# Patient Record
Sex: FEMALE | Race: WHITE | NOT HISPANIC OR LATINO | URBAN - METROPOLITAN AREA
[De-identification: names, ages, dates, MRNs, and addresses within clinical notes are randomized per-mention and may not be internally consistent; named-entity substitution may affect disease eponyms.]

---

## 2019-04-06 ENCOUNTER — APPOINTMENT (OUTPATIENT)
Dept: RADIOLOGY | Facility: MEDICAL CENTER | Age: 49
DRG: 551 | End: 2019-04-06
Attending: SURGERY
Payer: COMMERCIAL

## 2019-04-06 ENCOUNTER — APPOINTMENT (OUTPATIENT)
Dept: RADIOLOGY | Facility: MEDICAL CENTER | Age: 49
DRG: 551 | End: 2019-04-06
Attending: NURSE PRACTITIONER
Payer: COMMERCIAL

## 2019-04-06 ENCOUNTER — APPOINTMENT (OUTPATIENT)
Dept: RADIOLOGY | Facility: MEDICAL CENTER | Age: 49
DRG: 551 | End: 2019-04-06
Payer: COMMERCIAL

## 2019-04-06 ENCOUNTER — APPOINTMENT (OUTPATIENT)
Dept: RADIOLOGY | Facility: MEDICAL CENTER | Age: 49
DRG: 551 | End: 2019-04-06
Attending: EMERGENCY MEDICINE
Payer: COMMERCIAL

## 2019-04-06 ENCOUNTER — HOSPITAL ENCOUNTER (INPATIENT)
Facility: MEDICAL CENTER | Age: 49
LOS: 4 days | DRG: 551 | End: 2019-04-10
Attending: EMERGENCY MEDICINE | Admitting: SURGERY
Payer: COMMERCIAL

## 2019-04-06 DIAGNOSIS — S12.600A CLOSED DISPLACED FRACTURE OF SEVENTH CERVICAL VERTEBRA, UNSPECIFIED FRACTURE MORPHOLOGY, INITIAL ENCOUNTER (HCC): ICD-10-CM
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
## 2019-04-06 DIAGNOSIS — T14.90XA TRAUMA: ICD-10-CM

## 2019-04-06 DIAGNOSIS — S42.254A CLOSED NONDISPLACED FRACTURE OF GREATER TUBEROSITY OF RIGHT HUMERUS, INITIAL ENCOUNTER: ICD-10-CM

## 2019-04-06 DIAGNOSIS — S39.92XA BACK INJURY DUE TO SKIING ACCIDENT, INITIAL ENCOUNTER: ICD-10-CM

## 2019-04-06 DIAGNOSIS — S22.009A CLOSED FRACTURE OF MULTIPLE THORACIC VERTEBRAE, INITIAL ENCOUNTER (HCC): ICD-10-CM

## 2019-04-06 DIAGNOSIS — S52.124A CLOSED NONDISPLACED FRACTURE OF HEAD OF RIGHT RADIUS, INITIAL ENCOUNTER: ICD-10-CM

## 2019-04-06 DIAGNOSIS — E87.6 HYPOKALEMIA: ICD-10-CM

## 2019-04-06 PROBLEM — S52.121A CLOSED FRACTURE OF HEAD OF RIGHT RADIUS: Status: ACTIVE | Noted: 2019-04-06

## 2019-04-06 PROBLEM — M25.511 ACUTE PAIN OF RIGHT SHOULDER: Status: ACTIVE | Noted: 2019-04-06

## 2019-04-06 PROBLEM — S42.251A CLOSED FRACTURE OF GREATER TUBEROSITY OF RIGHT HUMERUS: Status: ACTIVE | Noted: 2019-04-06

## 2019-04-06 PROBLEM — S06.0X1A CONCUSSION WITH LOSS OF CONSCIOUSNESS OF 30 MINUTES OR LESS: Status: ACTIVE | Noted: 2019-04-06

## 2019-04-06 PROBLEM — Z53.09 CONTRAINDICATION TO DEEP VEIN THROMBOSIS (DVT) PROPHYLAXIS: Status: ACTIVE | Noted: 2019-04-06

## 2019-04-06 PROBLEM — S22.21XA FRACTURE OF MANUBRIUM: Status: ACTIVE | Noted: 2019-04-06

## 2019-04-06 PROBLEM — S06.6X1A TRAUMATIC SUBARACHNOID HEMORRHAGE WITH LOSS OF CONSCIOUSNESS OF 30 MINUTES OR LESS (HCC): Status: ACTIVE | Noted: 2019-04-06

## 2019-04-06 PROBLEM — M48.50XA COMPRESSION FRACTURE OF VERTEBRA (HCC): Status: ACTIVE | Noted: 2019-04-06

## 2019-04-06 LAB
ABO GROUP BLD: NORMAL
ALBUMIN SERPL BCP-MCNC: 4.4 G/DL (ref 3.2–4.9)
ALBUMIN/GLOB SERPL: 1.6 G/DL
ALP SERPL-CCNC: 94 U/L (ref 30–99)
ALT SERPL-CCNC: 51 U/L (ref 2–50)
ANION GAP SERPL CALC-SCNC: 17 MMOL/L (ref 0–11.9)
APTT PPP: 27.6 SEC (ref 24.7–36)
AST SERPL-CCNC: 53 U/L (ref 12–45)
BILIRUB SERPL-MCNC: 0.6 MG/DL (ref 0.1–1.5)
BLD GP AB SCN SERPL QL: NORMAL
BUN SERPL-MCNC: 12 MG/DL (ref 8–22)
CALCIUM SERPL-MCNC: 9.3 MG/DL (ref 8.5–10.5)
CHLORIDE SERPL-SCNC: 105 MMOL/L (ref 96–112)
CO2 SERPL-SCNC: 22 MMOL/L (ref 20–33)
CREAT SERPL-MCNC: 0.73 MG/DL (ref 0.5–1.4)
EKG IMPRESSION: NORMAL
ERYTHROCYTE [DISTWIDTH] IN BLOOD BY AUTOMATED COUNT: 40.2 FL (ref 35.9–50)
ETHANOL BLD-MCNC: 0.01 G/DL
GLOBULIN SER CALC-MCNC: 2.7 G/DL (ref 1.9–3.5)
GLUCOSE SERPL-MCNC: 173 MG/DL (ref 65–99)
HCG SERPL QL: NEGATIVE
HCT VFR BLD AUTO: 38.4 % (ref 37–47)
HGB BLD-MCNC: 13.1 G/DL (ref 12–16)
INR PPP: 1.2 (ref 0.87–1.13)
MCH RBC QN AUTO: 30.6 PG (ref 27–33)
MCHC RBC AUTO-ENTMCNC: 34.1 G/DL (ref 33.6–35)
MCV RBC AUTO: 89.7 FL (ref 81.4–97.8)
PLATELET # BLD AUTO: 256 K/UL (ref 164–446)
PMV BLD AUTO: 9 FL (ref 9–12.9)
POTASSIUM SERPL-SCNC: 2.6 MMOL/L (ref 3.6–5.5)
PROT SERPL-MCNC: 7.1 G/DL (ref 6–8.2)
PROTHROMBIN TIME: 15.3 SEC (ref 12–14.6)
RBC # BLD AUTO: 4.28 M/UL (ref 4.2–5.4)
RH BLD: NORMAL
SODIUM SERPL-SCNC: 144 MMOL/L (ref 135–145)
WBC # BLD AUTO: 18.2 K/UL (ref 4.8–10.8)

## 2019-04-06 PROCEDURE — 72125 CT NECK SPINE W/O DYE: CPT

## 2019-04-06 PROCEDURE — 73030 X-RAY EXAM OF SHOULDER: CPT | Mod: RT

## 2019-04-06 PROCEDURE — 700102 HCHG RX REV CODE 250 W/ 637 OVERRIDE(OP): Performed by: NEUROLOGICAL SURGERY

## 2019-04-06 PROCEDURE — 99291 CRITICAL CARE FIRST HOUR: CPT

## 2019-04-06 PROCEDURE — 70450 CT HEAD/BRAIN W/O DYE: CPT

## 2019-04-06 PROCEDURE — 72131 CT LUMBAR SPINE W/O DYE: CPT

## 2019-04-06 PROCEDURE — 85610 PROTHROMBIN TIME: CPT

## 2019-04-06 PROCEDURE — 700111 HCHG RX REV CODE 636 W/ 250 OVERRIDE (IP): Performed by: EMERGENCY MEDICINE

## 2019-04-06 PROCEDURE — 700105 HCHG RX REV CODE 258: Performed by: SURGERY

## 2019-04-06 PROCEDURE — 306637 HCHG MISC ORTHO ITEM RC 0274

## 2019-04-06 PROCEDURE — 86900 BLOOD TYPING SEROLOGIC ABO: CPT

## 2019-04-06 PROCEDURE — 72128 CT CHEST SPINE W/O DYE: CPT

## 2019-04-06 PROCEDURE — 93005 ELECTROCARDIOGRAM TRACING: CPT | Performed by: NURSE PRACTITIONER

## 2019-04-06 PROCEDURE — 80307 DRUG TEST PRSMV CHEM ANLYZR: CPT

## 2019-04-06 PROCEDURE — 86901 BLOOD TYPING SEROLOGIC RH(D): CPT

## 2019-04-06 PROCEDURE — L0464 TLSO 4MOD SACRO-SCAP PRE: HCPCS

## 2019-04-06 PROCEDURE — 93010 ELECTROCARDIOGRAM REPORT: CPT | Performed by: INTERNAL MEDICINE

## 2019-04-06 PROCEDURE — 85027 COMPLETE CBC AUTOMATED: CPT

## 2019-04-06 PROCEDURE — 76705 ECHO EXAM OF ABDOMEN: CPT

## 2019-04-06 PROCEDURE — 73080 X-RAY EXAM OF ELBOW: CPT | Mod: RT

## 2019-04-06 PROCEDURE — 94760 N-INVAS EAR/PLS OXIMETRY 1: CPT

## 2019-04-06 PROCEDURE — 51798 US URINE CAPACITY MEASURE: CPT

## 2019-04-06 PROCEDURE — 700117 HCHG RX CONTRAST REV CODE 255: Performed by: EMERGENCY MEDICINE

## 2019-04-06 PROCEDURE — 71045 X-RAY EXAM CHEST 1 VIEW: CPT

## 2019-04-06 PROCEDURE — 84703 CHORIONIC GONADOTROPIN ASSAY: CPT

## 2019-04-06 PROCEDURE — 71260 CT THORAX DX C+: CPT

## 2019-04-06 PROCEDURE — 96374 THER/PROPH/DIAG INJ IV PUSH: CPT

## 2019-04-06 PROCEDURE — 700101 HCHG RX REV CODE 250: Performed by: NEUROLOGICAL SURGERY

## 2019-04-06 PROCEDURE — 72170 X-RAY EXAM OF PELVIS: CPT

## 2019-04-06 PROCEDURE — A9270 NON-COVERED ITEM OR SERVICE: HCPCS | Performed by: NEUROLOGICAL SURGERY

## 2019-04-06 PROCEDURE — G0390 TRAUMA RESPONS W/HOSP CRITI: HCPCS

## 2019-04-06 PROCEDURE — 86850 RBC ANTIBODY SCREEN: CPT

## 2019-04-06 PROCEDURE — 80053 COMPREHEN METABOLIC PANEL: CPT

## 2019-04-06 PROCEDURE — 85730 THROMBOPLASTIN TIME PARTIAL: CPT

## 2019-04-06 PROCEDURE — 96375 TX/PRO/DX INJ NEW DRUG ADDON: CPT

## 2019-04-06 PROCEDURE — 770022 HCHG ROOM/CARE - ICU (200)

## 2019-04-06 RX ORDER — DOCUSATE SODIUM 100 MG/1
100 CAPSULE, LIQUID FILLED ORAL 2 TIMES DAILY
Status: DISCONTINUED | OUTPATIENT
Start: 2019-04-06 | End: 2019-04-06

## 2019-04-06 RX ORDER — OXYCODONE HYDROCHLORIDE 5 MG/1
5 TABLET ORAL
Status: DISCONTINUED | OUTPATIENT
Start: 2019-04-06 | End: 2019-04-06

## 2019-04-06 RX ORDER — LORAZEPAM 1 MG/1
0.5 TABLET ORAL EVERY 6 HOURS PRN
Status: DISCONTINUED | OUTPATIENT
Start: 2019-04-06 | End: 2019-04-07

## 2019-04-06 RX ORDER — METOCLOPRAMIDE HYDROCHLORIDE 5 MG/ML
10 INJECTION INTRAMUSCULAR; INTRAVENOUS ONCE
Status: COMPLETED | OUTPATIENT
Start: 2019-04-06 | End: 2019-04-06

## 2019-04-06 RX ORDER — ACETAMINOPHEN 500 MG
1000 TABLET ORAL EVERY 6 HOURS
Status: DISCONTINUED | OUTPATIENT
Start: 2019-04-06 | End: 2019-04-10 | Stop reason: HOSPADM

## 2019-04-06 RX ORDER — OXYCODONE HYDROCHLORIDE 5 MG/1
2.5 TABLET ORAL
Status: DISCONTINUED | OUTPATIENT
Start: 2019-04-06 | End: 2019-04-10 | Stop reason: HOSPADM

## 2019-04-06 RX ORDER — SODIUM CHLORIDE, SODIUM LACTATE, POTASSIUM CHLORIDE, CALCIUM CHLORIDE 600; 310; 30; 20 MG/100ML; MG/100ML; MG/100ML; MG/100ML
INJECTION, SOLUTION INTRAVENOUS CONTINUOUS
Status: DISCONTINUED | OUTPATIENT
Start: 2019-04-06 | End: 2019-04-06

## 2019-04-06 RX ORDER — DOCUSATE SODIUM 100 MG/1
100 CAPSULE, LIQUID FILLED ORAL 2 TIMES DAILY
Status: DISCONTINUED | OUTPATIENT
Start: 2019-04-06 | End: 2019-04-10 | Stop reason: HOSPADM

## 2019-04-06 RX ORDER — ACETAMINOPHEN 650 MG/1
650 SUPPOSITORY RECTAL EVERY 4 HOURS PRN
Status: DISCONTINUED | OUTPATIENT
Start: 2019-04-06 | End: 2019-04-07

## 2019-04-06 RX ORDER — CALCIUM CARBONATE 500 MG/1
500 TABLET, CHEWABLE ORAL 2 TIMES DAILY
Status: DISCONTINUED | OUTPATIENT
Start: 2019-04-06 | End: 2019-04-10 | Stop reason: HOSPADM

## 2019-04-06 RX ORDER — ONDANSETRON 2 MG/ML
INJECTION INTRAMUSCULAR; INTRAVENOUS
Status: COMPLETED | OUTPATIENT
Start: 2019-04-06 | End: 2019-04-06

## 2019-04-06 RX ORDER — ENEMA 19; 7 G/133ML; G/133ML
1 ENEMA RECTAL
Status: DISCONTINUED | OUTPATIENT
Start: 2019-04-06 | End: 2019-04-06

## 2019-04-06 RX ORDER — MORPHINE SULFATE 4 MG/ML
4 INJECTION, SOLUTION INTRAMUSCULAR; INTRAVENOUS ONCE
Status: COMPLETED | OUTPATIENT
Start: 2019-04-06 | End: 2019-04-06

## 2019-04-06 RX ORDER — SODIUM CHLORIDE 9 MG/ML
INJECTION, SOLUTION INTRAVENOUS
Status: COMPLETED | OUTPATIENT
Start: 2019-04-06 | End: 2019-04-06

## 2019-04-06 RX ORDER — POLYETHYLENE GLYCOL 3350 17 G/17G
1 POWDER, FOR SOLUTION ORAL 2 TIMES DAILY PRN
Status: DISCONTINUED | OUTPATIENT
Start: 2019-04-06 | End: 2019-04-06

## 2019-04-06 RX ORDER — OXYCODONE HYDROCHLORIDE 5 MG/1
10 TABLET ORAL
Status: DISCONTINUED | OUTPATIENT
Start: 2019-04-06 | End: 2019-04-06

## 2019-04-06 RX ORDER — ACETAMINOPHEN 500 MG
1000 TABLET ORAL EVERY 6 HOURS
Status: DISCONTINUED | OUTPATIENT
Start: 2019-04-06 | End: 2019-04-06

## 2019-04-06 RX ORDER — DIPHENHYDRAMINE HYDROCHLORIDE 50 MG/ML
25 INJECTION INTRAMUSCULAR; INTRAVENOUS EVERY 6 HOURS PRN
Status: DISCONTINUED | OUTPATIENT
Start: 2019-04-06 | End: 2019-04-10 | Stop reason: HOSPADM

## 2019-04-06 RX ORDER — LABETALOL HYDROCHLORIDE 5 MG/ML
10 INJECTION, SOLUTION INTRAVENOUS
Status: DISCONTINUED | OUTPATIENT
Start: 2019-04-06 | End: 2019-04-07

## 2019-04-06 RX ORDER — GABAPENTIN 300 MG/1
300 CAPSULE ORAL 3 TIMES DAILY
Status: DISCONTINUED | OUTPATIENT
Start: 2019-04-06 | End: 2019-04-08

## 2019-04-06 RX ORDER — ENEMA 19; 7 G/133ML; G/133ML
1 ENEMA RECTAL
Status: DISCONTINUED | OUTPATIENT
Start: 2019-04-06 | End: 2019-04-10 | Stop reason: HOSPADM

## 2019-04-06 RX ORDER — MORPHINE SULFATE 4 MG/ML
2-4 INJECTION, SOLUTION INTRAMUSCULAR; INTRAVENOUS
Status: DISCONTINUED | OUTPATIENT
Start: 2019-04-06 | End: 2019-04-06

## 2019-04-06 RX ORDER — BISACODYL 10 MG
10 SUPPOSITORY, RECTAL RECTAL
Status: DISCONTINUED | OUTPATIENT
Start: 2019-04-06 | End: 2019-04-10 | Stop reason: HOSPADM

## 2019-04-06 RX ORDER — MORPHINE SULFATE 4 MG/ML
2 INJECTION, SOLUTION INTRAMUSCULAR; INTRAVENOUS
Status: DISCONTINUED | OUTPATIENT
Start: 2019-04-06 | End: 2019-04-07

## 2019-04-06 RX ORDER — LORAZEPAM 2 MG/ML
0.5 INJECTION INTRAMUSCULAR EVERY 6 HOURS PRN
Status: DISCONTINUED | OUTPATIENT
Start: 2019-04-06 | End: 2019-04-07

## 2019-04-06 RX ORDER — POTASSIUM CHLORIDE 7.45 MG/ML
10 INJECTION INTRAVENOUS ONCE
Status: COMPLETED | OUTPATIENT
Start: 2019-04-06 | End: 2019-04-06

## 2019-04-06 RX ORDER — SODIUM CHLORIDE AND POTASSIUM CHLORIDE 150; 900 MG/100ML; MG/100ML
INJECTION, SOLUTION INTRAVENOUS CONTINUOUS
Status: DISCONTINUED | OUTPATIENT
Start: 2019-04-06 | End: 2019-04-07

## 2019-04-06 RX ORDER — AMOXICILLIN 250 MG
1 CAPSULE ORAL NIGHTLY
Status: DISCONTINUED | OUTPATIENT
Start: 2019-04-06 | End: 2019-04-06

## 2019-04-06 RX ORDER — POLYETHYLENE GLYCOL 3350 17 G/17G
1 POWDER, FOR SOLUTION ORAL 2 TIMES DAILY
Status: DISCONTINUED | OUTPATIENT
Start: 2019-04-06 | End: 2019-04-10 | Stop reason: HOSPADM

## 2019-04-06 RX ORDER — AMOXICILLIN 250 MG
1 CAPSULE ORAL
Status: DISCONTINUED | OUTPATIENT
Start: 2019-04-06 | End: 2019-04-10 | Stop reason: HOSPADM

## 2019-04-06 RX ORDER — METHOCARBAMOL 750 MG/1
750 TABLET, FILM COATED ORAL EVERY 8 HOURS PRN
Status: DISCONTINUED | OUTPATIENT
Start: 2019-04-06 | End: 2019-04-10 | Stop reason: HOSPADM

## 2019-04-06 RX ORDER — OXYCODONE HYDROCHLORIDE 5 MG/1
5 TABLET ORAL
Status: DISCONTINUED | OUTPATIENT
Start: 2019-04-06 | End: 2019-04-10 | Stop reason: HOSPADM

## 2019-04-06 RX ORDER — ACETAMINOPHEN 325 MG/1
650 TABLET ORAL EVERY 4 HOURS PRN
Status: DISCONTINUED | OUTPATIENT
Start: 2019-04-06 | End: 2019-04-07

## 2019-04-06 RX ORDER — FAMOTIDINE 20 MG/1
20 TABLET, FILM COATED ORAL 2 TIMES DAILY
Status: DISCONTINUED | OUTPATIENT
Start: 2019-04-06 | End: 2019-04-07

## 2019-04-06 RX ORDER — BISACODYL 10 MG
10 SUPPOSITORY, RECTAL RECTAL
Status: DISCONTINUED | OUTPATIENT
Start: 2019-04-06 | End: 2019-04-06

## 2019-04-06 RX ORDER — ONDANSETRON 2 MG/ML
4 INJECTION INTRAMUSCULAR; INTRAVENOUS EVERY 4 HOURS PRN
Status: DISCONTINUED | OUTPATIENT
Start: 2019-04-06 | End: 2019-04-10 | Stop reason: HOSPADM

## 2019-04-06 RX ORDER — DIPHENHYDRAMINE HCL 25 MG
25 TABLET ORAL EVERY 6 HOURS PRN
Status: DISCONTINUED | OUTPATIENT
Start: 2019-04-06 | End: 2019-04-10 | Stop reason: HOSPADM

## 2019-04-06 RX ADMIN — VITAMIN D, TAB 1000IU (100/BT) 5000 UNITS: 25 TAB at 17:09

## 2019-04-06 RX ADMIN — ACETAMINOPHEN 1000 MG: 500 TABLET ORAL at 17:11

## 2019-04-06 RX ADMIN — ACETAMINOPHEN 1000 MG: 500 TABLET ORAL at 23:33

## 2019-04-06 RX ADMIN — OXYCODONE HYDROCHLORIDE 2.5 MG: 5 TABLET ORAL at 22:06

## 2019-04-06 RX ADMIN — POTASSIUM CHLORIDE 10 MEQ: 7.46 INJECTION, SOLUTION INTRAVENOUS at 17:12

## 2019-04-06 RX ADMIN — MORPHINE SULFATE 4 MG: 4 INJECTION INTRAVENOUS at 15:53

## 2019-04-06 RX ADMIN — POTASSIUM CHLORIDE AND SODIUM CHLORIDE: 900; 150 INJECTION, SOLUTION INTRAVENOUS at 17:12

## 2019-04-06 RX ADMIN — METOCLOPRAMIDE 10 MG: 5 INJECTION, SOLUTION INTRAMUSCULAR; INTRAVENOUS at 15:36

## 2019-04-06 RX ADMIN — OXYCODONE HYDROCHLORIDE 5 MG: 5 TABLET ORAL at 17:11

## 2019-04-06 RX ADMIN — SODIUM CHLORIDE 1000 ML: 9 INJECTION, SOLUTION INTRAVENOUS at 14:39

## 2019-04-06 RX ADMIN — GABAPENTIN 300 MG: 300 CAPSULE ORAL at 17:11

## 2019-04-06 RX ADMIN — ONDANSETRON 4 MG: 2 INJECTION INTRAMUSCULAR; INTRAVENOUS at 14:45

## 2019-04-06 RX ADMIN — IOHEXOL 100 ML: 350 INJECTION, SOLUTION INTRAVENOUS at 15:14

## 2019-04-06 RX ADMIN — FENTANYL CITRATE 50 MCG: 50 INJECTION, SOLUTION INTRAMUSCULAR; INTRAVENOUS at 14:43

## 2019-04-06 ASSESSMENT — COPD QUESTIONNAIRES
COPD SCREENING SCORE: 0
DO YOU EVER COUGH UP ANY MUCUS OR PHLEGM?: NO/ONLY WITH OCCASIONAL COLDS OR INFECTIONS
HAVE YOU SMOKED AT LEAST 100 CIGARETTES IN YOUR ENTIRE LIFE: NO/DON'T KNOW
DURING THE PAST 4 WEEKS HOW MUCH DID YOU FEEL SHORT OF BREATH: NONE/LITTLE OF THE TIME

## 2019-04-06 ASSESSMENT — COGNITIVE AND FUNCTIONAL STATUS - GENERAL
DRESSING REGULAR LOWER BODY CLOTHING: A LITTLE
MOBILITY SCORE: 18
SUGGESTED CMS G CODE MODIFIER MOBILITY: CK
MOVING FROM LYING ON BACK TO SITTING ON SIDE OF FLAT BED: A LITTLE
CLIMB 3 TO 5 STEPS WITH RAILING: A LITTLE
EATING MEALS: A LITTLE
DAILY ACTIVITIY SCORE: 18
MOVING TO AND FROM BED TO CHAIR: A LITTLE
HELP NEEDED FOR BATHING: A LITTLE
WALKING IN HOSPITAL ROOM: A LITTLE
STANDING UP FROM CHAIR USING ARMS: A LITTLE
TOILETING: A LITTLE
PERSONAL GROOMING: A LITTLE
TURNING FROM BACK TO SIDE WHILE IN FLAT BAD: A LITTLE
DRESSING REGULAR UPPER BODY CLOTHING: A LITTLE
SUGGESTED CMS G CODE MODIFIER DAILY ACTIVITY: CK

## 2019-04-06 ASSESSMENT — PATIENT HEALTH QUESTIONNAIRE - PHQ9
2. FEELING DOWN, DEPRESSED, IRRITABLE, OR HOPELESS: NOT AT ALL
SUM OF ALL RESPONSES TO PHQ9 QUESTIONS 1 AND 2: 0
1. LITTLE INTEREST OR PLEASURE IN DOING THINGS: NOT AT ALL

## 2019-04-06 ASSESSMENT — LIFESTYLE VARIABLES
ALCOHOL_USE: NO
EVER_SMOKED: NEVER
EVER_SMOKED: NEVER

## 2019-04-06 NOTE — ED NOTES
Unwitnessed event at [facility].  Helmeted skiier (Monica Franco  1971), dent to L. Helmet- inner shell cracked all the way through.  R. Shoulder pain and mid upper back pain, A/Ox3.  GCS 13

## 2019-04-06 NOTE — ASSESSMENT & PLAN NOTE
Mild acute compression fractures at T7, T8, T11 and T12 with minimal loss of height and at T7, T11 and T12.  Non-operative management.   TLSO when she sits up more than 30 degrees or when she is out of bed for 6 weeks  La Nena Tian MD. Neurosurgery.

## 2019-04-06 NOTE — RESPIRATORY CARE
Respiratory Trauma Red Note    Intubation no    SPO2 100% 15 lpm non re-breather.

## 2019-04-06 NOTE — ASSESSMENT & PLAN NOTE
Minimally displaced fracture of the anterior aspect of the manubrium, adjacent the sternomanubrial junction.  Aggressive pulmonary hygiene and multimodal pain management.

## 2019-04-06 NOTE — H&P
Trauma History and Physical  4/6/2019    Attending Physician: Shree Darnell MD.     CC: Trauma The patient was triaged as a Trauma Red in accordance with established pre hospital protols. An expeditious primary and secondary survey with required adjuncts was conducted. See Trauma Narrator for full details.    HPI: This is approximately 48-year-old female report skiing when struck tree.  Report loss of consciousness.  She reports pain upper thoracic spine right shoulder right elbow.    She states pain is sharp severe.  She states pain is made worse with movement.  She states pain is improved with rest and medication.  She states no numbness tingling or weakness anywhere in her body.    She states no chest pain.  She states no abdominal pain.  She states no pain in her lower extremities.  She states normal sensation and motor         No past medical history on file.    No past surgical history on file.    Current Facility-Administered Medications   Medication Dose Route Frequency Provider Last Rate Last Dose   • potassium chloride (KCL) ivpb 10 mEq  10 mEq Intravenous Once Sun Moore M.D.       • Respiratory Care per Protocol   Nebulization Continuous RT Vicky Salmeron A.P.N.       • Pharmacy Consult Request ...Pain Management Review 1 Each  1 Each Other PHARMACY TO DOSE Vicky Salmeron, A.P.N.       • docusate sodium (COLACE) capsule 100 mg  100 mg Oral BID Vicky Salmeron, A.P.N.       • senna-docusate (PERICOLACE or SENOKOT S) 8.6-50 MG per tablet 1 Tab  1 Tab Oral Q24HRS PRN Vicky Salmeron, A.P.N.       • polyethylene glycol/lytes (MIRALAX) PACKET 1 Packet  1 Packet Oral BID Vicky Salmeron, A.P.N.       • magnesium hydroxide (MILK OF MAGNESIA) suspension 30 mL  30 mL Oral DAILY Vicky Salmeron, A.P.N.       • bisacodyl (DULCOLAX) suppository 10 mg  10 mg Rectal Q24HRS PRN Vicky Salmeron, A.P.N.       • LR infusion   Intravenous Continuous Vicky Salmeron, A.P.N.       • famotidine  (PEPCID) tablet 20 mg  20 mg Oral BID Vicky Salmeron, A.P.N.        Or   • famotidine (PEPCID) injection 20 mg  20 mg Intravenous BID Vicky Salmeron, A.P.N.       • ondansetron (ZOFRAN) syringe/vial injection 4 mg  4 mg Intravenous Q4HRS PRN Vicky Salmeron, A.P.N.       • acetaminophen (TYLENOL) tablet 650 mg  650 mg Oral Q4HRS PRN Vicky Salmeron, A.P.N.        Or   • acetaminophen (TYLENOL) suppository 650 mg  650 mg Rectal Q4HRS PRN Vicky Salmeron A.P.N.       • acetaminophen (TYLENOL) tablet 1,000 mg  1,000 mg Oral Q6HRS La Nena Tian M.D.       • oxyCODONE immediate-release (ROXICODONE) tablet 2.5 mg  2.5 mg Oral Q3HRS PRN La Nena Tian M.D.       • oxyCODONE immediate-release (ROXICODONE) tablet 5 mg  5 mg Oral Q3HRS PRN La Nena Tian M.D.       • MD ALERT...DO NOT ADMINISTER NSAIDS or ASPIRIN unless ORDERED By Neurosurgery 1 Each  1 Each Other PRN La Nena Tian M.D.       • diphenhydrAMINE (BENADRYL) tablet/capsule 25 mg  25 mg Oral Q6HRS PRN La Nena Tian M.D.        Or   • diphenhydrAMINE (BENADRYL) injection 25 mg  25 mg Intravenous Q6HRS PRN La Nena Tian M.D.       • methocarbamol (ROBAXIN) tablet 750 mg  750 mg Oral Q8HRS PRN La Nena Tian M.D.       • LORazepam (ATIVAN) tablet 0.5 mg  0.5 mg Oral Q6HRS PRN La Nena Tian M.D.        Or   • LORazepam (ATIVAN) injection 0.5 mg  0.5 mg Intravenous Q6HRS PRN La Nena Tian M.D.       • labetalol (NORMODYNE,TRANDATE) injection 10 mg  10 mg Intravenous Q HOUR PRN La Nena Tian M.D.       • calcium carbonate (TUMS) chewable tab 500 mg  500 mg Oral BID La Nena Tian M.D.       • vitamin D (cholecalciferol) tablet 5,000 Units  5,000 Units Oral DAILY La Nena Tian M.D.       • senna-docusate (PERICOLACE or SENOKOT S) 8.6-50 MG per tablet 1 Tab  1 Tab Oral Q24HRS PRN La Nena Tian M.D.       • fleet enema 133 mL  1 Each Rectal Once PRN La Nena Tian M.D.       • 0.9 % NaCl with KCl 20 mEq infusion    "Intravenous Continuous La Nena Tian M.D.       • morphine (pf) 4 mg/ml injection 2 mg  2 mg Intravenous Q3HRS PRN La Nena Tian M.D.           Social History     Social History   • Marital status: N/A     Spouse name: N/A   • Number of children: N/A   • Years of education: N/A     Occupational History   • Not on file.     Social History Main Topics   • Smoking status: Not on file   • Smokeless tobacco: Not on file   • Alcohol use Not on file   • Drug use: Unknown   • Sexual activity: Not on file     Other Topics Concern   • Not on file     Social History Narrative   • No narrative on file       No family history on file.    Allergies:  Patient has no allergy information on record.    Review of Systems:  Positive noted above otherwise negative    Physical Exam:  /85   Pulse 83   Temp 35.6 °C (96.1 °F) (Temporal)   Resp 18   Ht 1.626 m (5' 4\")   Wt 59 kg (130 lb)   SpO2 98%     Constitutional: Awake, interactive  .Head:  Cephalohematoma/abrasion forehead. Pupils equal reactive  No bleeding ears nose or mouth     neck: No tracheal deviation. No midline cervical spine tenderness. C-collar in place. No stridor     cardiovascular: Normal rate, brisk capillary refill    Pulmonary/Chest: Clavicles nontender to palpation. There tenderness right shoulder no crepitus. Positive breath sounds bilaterally.     Abdominal: Soft, nondistended. Nontender to palpation. Pelvis is stable to anterior-posterior compression.     Musculoskeletal: Right upper extremity tenderness shoulder and elbow palpable pulse reports normal sensation   left upper extremity grossly atraumatic, palpable pulse.  No tenderness reports normal sensation  Lower extremities without tenderness.  Palpable pulses reports normal sensation     back: Midline thoracic tender to palpation. No step-offs. Mild sacral erythema present.    Neurological: Awake appropriate reports sensation intact  .   Skin: Skin is cool and dry.   Psychiatric:  Normal mood " and affect.  Behavior is appropriate.       Labs:  Recent Labs      04/06/19   1427   WBC  18.2*   RBC  4.28   HEMOGLOBIN  13.1   HEMATOCRIT  38.4   MCV  89.7   MCH  30.6   MCHC  34.1   RDW  40.2   PLATELETCT  256   MPV  9.0     Recent Labs      04/06/19   1427   SODIUM  144   POTASSIUM  2.6*   CHLORIDE  105   CO2  22   GLUCOSE  173*   BUN  12   CREATININE  0.73   CALCIUM  9.3     Recent Labs      04/06/19   1427   APTT  27.6   INR  1.20*     Recent Labs      04/06/19   1427   ASTSGOT  53*   ALTSGPT  51*   TBILIRUBIN  0.6   ALKPHOSPHAT  94   GLOBULIN  2.7   INR  1.20*       Radiology:  CT-CSPINE WITHOUT PLUS RECONS   Final Result      Fracture of the right C7 facet at the superior aspect. There is anterior displacement of that fracture component resulting in moderate narrowing of the right C6-7 neuroforamen. Alignment of cervical spine is otherwise well maintained.      This was discussed with MARNIE GUIDO at 3:22 PM on 4/6/2019.      CT-CHEST,ABDOMEN,PELVIS WITH   Final Result      1.  Bilateral dependent atelectasis.   2.  Probable minimally displaced fracture of the anterior aspect of the manubrium, adjacent the sternomanubrial junction.   3.  Solid organs of the abdomen are intact.   4.  Mild acute compression fractures at T7, T8, T11 and T12 with minimal loss of height and at T7, T11 and T12.      These findings were discussed with MARNIE GUIDO by Dr Tvaarez on 4/6/2019 3:21 PM.      CT-TSPINE W/O PLUS RECONS   Final Result      Compression fractures present at T7, T8, T9, T11 and T12. There is 20% height loss or less at these levels. No significant posterior bony retropulsion.      CT-LSPINE W/O PLUS RECONS   Final Result      1.  No evidence of fracture of the lumbar spine.      2.  Fracture of the T12 vertebral body which will be further discussed on the thoracic spine CT scan.      CT-HEAD W/O   Final Result      Minimal increased density in the LEFT posterior fossa anteriorly, likely choroid plexus  calcification, although small amount of subarachnoid hemorrhage is difficult to entirely exclude.  Follow-up recommended.         US-ABDOMEN F.A.S.T. LTD (FOR ED USE ONLY)   Final Result      No free fluid seen within the abdomen or pelvis.      DX-PELVIS-1 OR 2 VIEWS   Final Result      Limited exam showing no pelvic fracture.      DX-CHEST-LIMITED (1 VIEW)   Final Result      No evidence of acute intrathoracic injury.      DX-ELBOW-COMPLETE 3+ RIGHT    (Results Pending)   DX-SHOULDER 2+ RIGHT    (Results Pending)         Assessment: This is a approximately 48-year-old female critically injured report ski accident striking tree    Plan:   Active Hospital Problems    Diagnosis   • Compression fracture of vertebra (HCC) [M48.50XA]     Priority: High   • C7 cervical fracture (HCC) [S12.600A]     Priority: High   • Concussion with loss of consciousness of 30 minutes or less [S06.0X1A]     Priority: High   • Acute pain of right shoulder [M25.511]     Priority: Medium   • Contraindication to deep vein thrombosis (DVT) prophylaxis [Z53.09]     Priority: Medium   • Fracture of manubrium [S22.21XA]     Priority: Medium   • Hypokalemia [E87.6]     Priority: Medium   • Trauma [T14.90XA]     Priority: Low   Continue spine precautions.    Follow-up neurosurgery evaluation recommendations.    Pain control  Provide encouragement and support   Monitor neurostatus and comfort level  Adjust medications and comfort measures as needed    Card   monitor for signs of bleeding  Continue to monitor to maintain adequate HR and BP  Follow up labs    Pulm  continue aggressive pulmonary hygiene  Encourage cough deep breath, IS  scd dvt prohylaxis    GI   Bowel regime  Monitor abdominal exan    Renal  IV hydration  Monitor urine output, fluid balance        Critical care Time spent: 55 min    Shree Darnell MD, FACS  [facility]  [phone]

## 2019-04-06 NOTE — ASSESSMENT & PLAN NOTE
Fracture of the right C7 facet at the superior aspect. There is anterior displacement of that fracture component resulting in moderate narrowing of the right C6-7 neuroforamen.  4/9 MRI C-spine  Non-operative management. Payne J collar for 6 weeks.  La Nena Tian MD. Neurosurgery.

## 2019-04-06 NOTE — DISCHARGE PLANNING
Trauma Response    Referral: Trauma red Response    Intervention: SW responded to trauma red.  Pt was BIB [facility] after colliding with another skier.  Pt was confused upon arrival.  Pts name is Monica Franco (: 1970).  SW obtained the following pt information:MSW tried to talk to pt and she could not remember if anyone was with her. Pt stated she is from [place], [place]. MSW called and spoke to Natan at [facility]. Natan confirmed that p's daughter was with them on scene and is on her way to [facility] now.     Plan: MSW to remain available for support

## 2019-04-06 NOTE — ASSESSMENT & PLAN NOTE
Skier vs tree, helmeted, positive LOC.  Trauma Red Activation.  Shree Darnell MD. Trauma Surgery.

## 2019-04-06 NOTE — ASSESSMENT & PLAN NOTE
Possible traumatic subarachnoid hemorrhage left side posterior fossa.  Repeat head CT stable.  Non-operative management.   Post traumatic pharmacologic seizure prophylaxis not indicated.  4/8 SLP for cog eval negative for further cognitive evaluation  La Nena Tian MD. Neurosurgery.

## 2019-04-06 NOTE — CARE PLAN
Problem: Knowledge Deficit  Goal: Knowledge of disease process/condition, treatment plan, diagnostic tests, and medications will improve    Intervention: Explain information regarding disease process/condition, treatment plan, diagnostic tests, and medications and document in education  Welcomed to S-ICU. Updated patient on POC and anticipated next steps of hospitalization.       Problem: Pain Management  Goal: Pain level will decrease to patient's comfort goal    Intervention: Follow pain managment plan developed in collaboration with patient and Interdisciplinary Team  Assessed for pain and medicated per the MAR.

## 2019-04-06 NOTE — ED PROVIDER NOTES
"ED Provider Note    Scribed for Sun Moore M.D. by Garcia Dorman. 4/6/2019, 2:30 PM.    Means of arrival: EMS  History obtained from: EMS/ Patient  History limited by: Patient's altered mental status    CHIEF COMPLAINT  Trauma red - Skier vs tree    HPI  Degree Thirty-Six is a 89 y.o. female who presents to the Emergency Department as a trauma red following a skier vs tree accident. Patient was skiing at [facility] when she hit a tree. Patient had lost consciousness for 5 minutes and does not remember the events prior to her accident. EMS reports she is currently AOx3 and has a GCS 14, however is still confused. She was wearing a helmet which has dent on the outside of the shell and has a crack on the inside. Patient currently has nausea, shortness of breath and is complaining of right chest pain, right shoulder pain, and right side upper and mid back pain. She was given 4mg zofran and some fluids en route. She denies any past medical history or allergies to medications.    History is limited secondary to patient's altered mental status.    REVIEW OF SYSTEMS  Pertinent positives include nausea, loss of consciousness, shortness of breath, right chest pain, right shoulder pain, back pain.     ROS limited secondary to patient's altered mental status.    PAST MEDICAL HISTORY   None noted    SURGICAL HISTORY  patient denies any surgical history    SOCIAL HISTORY  None noted    FAMILY HISTORY  None noted    CURRENT MEDICATIONS  Current medications can be seen on the nurse’s note.    ALLERGIES  NKDA    PHYSICAL EXAM  VITAL SIGNS: /85   Pulse 83   Temp 35.6 °C (96.1 °F) (Temporal)   Resp 22   Ht 1.626 m (5' 4\")   Wt 59 kg (130 lb)   SpO2 100% Comment: NRB  BMI 22.31 kg/m²   Primary survey: airway clear, breathing with NRB, circulation well perfused, alert but slightly confused moving all extremities exposure was full  Constitutional: Alert in mild distress.  HENT: Normocephalic, Abrasion to left midline " forehead. Bilateral external ears normal without davies signs, no hemotympanum, Nose normal. Nose non-tender, no septal hematoma, no midface instability.  Eyes: Pupils are 3mm and equal and reactive, Conjunctiva normal, Non-icteric.EOMI.  Neck: In full spinal precautions  Cardiovascular: Regular rate and rhythm, no murmurs. Well perfused.  Thorax & Lungs: Equal breath sounds bilaterally, No respiratory distress, No wheezing, No chest tenderness.   Abdomen: Bowel sounds normal, Soft, No tenderness, No masses, No peritoneal signs.  Skin: Warm, Dry, No erythema, No rash.   Back: Mid thoracic spine tenderness, no step offs or deformities.   Musculoskeletal: No tenderness to palpation. No lower extremity deformities noted. Pelvis stable.      LABS  Labs Reviewed   DIAGNOSTIC ALCOHOL - Abnormal; Notable for the following:        Result Value    Diagnostic Alcohol 0.01 (*)     All other components within normal limits   CBC WITHOUT DIFFERENTIAL - Abnormal; Notable for the following:     WBC 18.2 (*)     All other components within normal limits   COMP METABOLIC PANEL - Abnormal; Notable for the following:     Potassium 2.6 (*)     Anion Gap 17.0 (*)     Glucose 173 (*)     AST(SGOT) 53 (*)     ALT(SGPT) 51 (*)     All other components within normal limits   PROTHROMBIN TIME - Abnormal; Notable for the following:     PT 15.3 (*)     INR 1.20 (*)     All other components within normal limits   APTT   HCG QUAL SERUM   COD (ADULT)   COMPONENT CELLULAR   ESTIMATED GFR   ABO AND RH CONFIRMATION   PLATELET MAPPING WITH BASIC TEG     All labs reviewed by me.      RADIOLOGY  DX-ELBOW-COMPLETE 3+ RIGHT   Final Result      Subtle findings raising concern for radial head fracture.  Follow-up recommended.      DX-SHOULDER 2+ RIGHT   Final Result      1.  Probable subtle RIGHT greater tuberosity fracture.   2.  No RIGHT shoulder dislocation.      CT-CSPINE WITHOUT PLUS RECONS   Final Result      Fracture of the right C7 facet at the  superior aspect. There is anterior displacement of that fracture component resulting in moderate narrowing of the right C6-7 neuroforamen. Alignment of cervical spine is otherwise well maintained.      This was discussed with MARNIE GUIDO at 3:22 PM on 4/6/2019.      CT-CHEST,ABDOMEN,PELVIS WITH   Final Result      1.  Bilateral dependent atelectasis.   2.  Probable minimally displaced fracture of the anterior aspect of the manubrium, adjacent the sternomanubrial junction.   3.  Solid organs of the abdomen are intact.   4.  Mild acute compression fractures at T7, T8, T11 and T12 with minimal loss of height and at T7, T11 and T12.      These findings were discussed with MARNIE GUIDO by Dr Tavarez on 4/6/2019 3:21 PM.      CT-TSPINE W/O PLUS RECONS   Final Result      Compression fractures present at T7, T8, T9, T11 and T12. There is 20% height loss or less at these levels. No significant posterior bony retropulsion.      CT-LSPINE W/O PLUS RECONS   Final Result      1.  No evidence of fracture of the lumbar spine.      2.  Fracture of the T12 vertebral body which will be further discussed on the thoracic spine CT scan.      CT-HEAD W/O   Final Result      Minimal increased density in the LEFT posterior fossa anteriorly, likely choroid plexus calcification, although small amount of subarachnoid hemorrhage is difficult to entirely exclude.  Follow-up recommended.         US-ABDOMEN F.A.S.T. LTD (FOR ED USE ONLY)   Final Result      No free fluid seen within the abdomen or pelvis.      DX-PELVIS-1 OR 2 VIEWS   Final Result      Limited exam showing no pelvic fracture.      DX-CHEST-LIMITED (1 VIEW)   Final Result      No evidence of acute intrathoracic injury.      DX-CHEST-PORTABLE (1 VIEW)    (Results Pending)   CT-HEAD W/O    (Results Pending)     The radiologist's interpretation of all radiological studies have been reviewed by me.    COURSE & MEDICAL DECISION MAKING  Pertinent Labs & Imaging studies reviewed. (See  chart for details)    2:49 PM - Patient seen and examined at bedside. Presents a trauma red following a skier vs tree accident. Chief complaint is nausea and mid thoracic pain. Dr. Darnell, trauma, in trauma bay evaluating. Patient will be treated with zofran 3mg, fentanyl 50. Ordered CT CTL spine, CT head, chest and pelvis xray, diagnostic alcohol, CBC, CMP, PTINR, APTT, HCG qual serum, Component cellular, estimated GFR, COD to evaluate her symptoms.     3:24 PM - Reevaluated patient, she is still confused and complaining of nausea. Spoke with Dr. Darnell trauma surgeon for admission. Patient's care was transferred at this time.    3:38 PM - Reviewed imaging results. Patient has T7, 8, 9, 11, 12 fractures with 20% height loss. C7 with some anterior displacement. Paged neurosurgery.    3:50 PM - Spoke with Dr. Tian, neurosurgery, who will consult on admission.       Decision Making:  This is a 48 y.o. year old female who presents with head injury after hitting a tree skiing.  She has hypokalemia on her labs.  She has a C7 fracture and multiple thoracic spine fractures.  She is quite concussed as well persistently.  Solid organs appear normal on CT scan.  She is a slight leukocytosis likely secondary to trauma.  Given her multiple fractures she will be admitted for pain control and further observation by trauma surgery.      DISPOSITION:  Patient will be admitted to Dr. Darnell in guarded condition.      FINAL IMPRESSION  1. Back injury due to skiing accident, initial encounter    2. Closed displaced fracture of seventh cervical vertebra, unspecified fracture morphology, initial encounter ([facility])    3. Closed fracture of multiple thoracic vertebrae, initial encounter ([facility])    4. Hypokalemia           This dictation has been created using voice recognition software and/or scribes. The accuracy of the dictation is limited by the abilities of the software and the expertise of the scribes. I expect there may be some errors  of grammar and possibly content. I made every attempt to manually correct the errors within my dictation. However, errors related to voice recognition software and/or scribes may still exist and should be interpreted within the appropriate context.     I, Garcia Dorman (Scribe), am scribing for, and in the presence of, Sun Moore M.D..    Electronically signed by: Garcia Dorman (Scribe), 4/6/2019    I, Sun Moore M.D. personally performed the services described in this documentation, as scribed by Garcia Dorman in my presence, and it is both accurate and complete. C.    The note accurately reflects work and decisions made by me.  Sun Moore  4/6/2019  7:58 PM

## 2019-04-06 NOTE — PROGRESS NOTES
Pt arrived to [facility] with ACLS RN and RN apprentice. Temp: 97.0f, wt: 62.5kg    HR SR 91  /82  SpO2 90% 2 L NC  RR 20/regular    2 RN skin check:   bilat ankle abrasions  bilat heels red/blanching, floated on pillows  Sacrum red/blanching, mepilex applied  Forehead lac, ROSE  Red under collar, new [place] J collar applied

## 2019-04-06 NOTE — ASSESSMENT & PLAN NOTE
Systemic anticoagulation contraindicated secondary to elevated bleeding risk.  4/7 Chemical DVT prophylaxis (Lovenox) initiated, 40mg daily per neurosurgery.  Ambulate TID.  Trauma duplex as clinically indicated.
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
## 2019-04-07 ENCOUNTER — APPOINTMENT (OUTPATIENT)
Dept: RADIOLOGY | Facility: MEDICAL CENTER | Age: 49
DRG: 551 | End: 2019-04-07
Attending: NURSE PRACTITIONER
Payer: COMMERCIAL

## 2019-04-07 ENCOUNTER — HOSPITAL ENCOUNTER (OUTPATIENT)
Dept: RADIOLOGY | Facility: MEDICAL CENTER | Age: 49
End: 2019-04-07
Attending: NEUROLOGICAL SURGERY
Payer: COMMERCIAL

## 2019-04-07 PROBLEM — Z02.9 DISCHARGE PLANNING ISSUES: Status: ACTIVE | Noted: 2019-04-07

## 2019-04-07 PROBLEM — E87.6 HYPOKALEMIA: Status: RESOLVED | Noted: 2019-04-06 | Resolved: 2019-04-07

## 2019-04-07 PROBLEM — R33.9 URINARY RETENTION: Status: ACTIVE | Noted: 2019-04-07

## 2019-04-07 LAB
ALBUMIN SERPL BCP-MCNC: 3.5 G/DL (ref 3.2–4.9)
ALBUMIN/GLOB SERPL: 1.6 G/DL
ALP SERPL-CCNC: 49 U/L (ref 30–99)
ALT SERPL-CCNC: 44 U/L (ref 2–50)
ANION GAP SERPL CALC-SCNC: 8 MMOL/L (ref 0–11.9)
AST SERPL-CCNC: 60 U/L (ref 12–45)
BASOPHILS # BLD AUTO: 0.1 % (ref 0–1.8)
BASOPHILS # BLD: 0.01 K/UL (ref 0–0.12)
BILIRUB SERPL-MCNC: 0.6 MG/DL (ref 0.1–1.5)
BUN SERPL-MCNC: 12 MG/DL (ref 8–22)
CALCIUM SERPL-MCNC: 8.5 MG/DL (ref 8.5–10.5)
CHLORIDE SERPL-SCNC: 111 MMOL/L (ref 96–112)
CO2 SERPL-SCNC: 22 MMOL/L (ref 20–33)
CREAT SERPL-MCNC: 0.56 MG/DL (ref 0.5–1.4)
EOSINOPHIL # BLD AUTO: 0 K/UL (ref 0–0.51)
EOSINOPHIL NFR BLD: 0 % (ref 0–6.9)
ERYTHROCYTE [DISTWIDTH] IN BLOOD BY AUTOMATED COUNT: 42.9 FL (ref 35.9–50)
GLOBULIN SER CALC-MCNC: 2.2 G/DL (ref 1.9–3.5)
GLUCOSE SERPL-MCNC: 125 MG/DL (ref 65–99)
HCT VFR BLD AUTO: 32.2 % (ref 37–47)
HGB BLD-MCNC: 10.7 G/DL (ref 12–16)
IMM GRANULOCYTES # BLD AUTO: 0.09 K/UL (ref 0–0.11)
IMM GRANULOCYTES NFR BLD AUTO: 0.7 % (ref 0–0.9)
LYMPHOCYTES # BLD AUTO: 1.26 K/UL (ref 1–4.8)
LYMPHOCYTES NFR BLD: 9.6 % (ref 22–41)
MCH RBC QN AUTO: 30.9 PG (ref 27–33)
MCHC RBC AUTO-ENTMCNC: 33.2 G/DL (ref 33.6–35)
MCV RBC AUTO: 93.1 FL (ref 81.4–97.8)
MONOCYTES # BLD AUTO: 0.66 K/UL (ref 0–0.85)
MONOCYTES NFR BLD AUTO: 5 % (ref 0–13.4)
NEUTROPHILS # BLD AUTO: 11.14 K/UL (ref 2–7.15)
NEUTROPHILS NFR BLD: 84.6 % (ref 44–72)
NRBC # BLD AUTO: 0 K/UL
NRBC BLD-RTO: 0 /100 WBC
PLATELET # BLD AUTO: 161 K/UL (ref 164–446)
PMV BLD AUTO: 9.3 FL (ref 9–12.9)
POTASSIUM SERPL-SCNC: 4.4 MMOL/L (ref 3.6–5.5)
PROT SERPL-MCNC: 5.7 G/DL (ref 6–8.2)
RBC # BLD AUTO: 3.46 M/UL (ref 4.2–5.4)
SODIUM SERPL-SCNC: 141 MMOL/L (ref 135–145)
WBC # BLD AUTO: 13.2 K/UL (ref 4.8–10.8)

## 2019-04-07 PROCEDURE — A9270 NON-COVERED ITEM OR SERVICE: HCPCS | Performed by: NURSE PRACTITIONER

## 2019-04-07 PROCEDURE — A9270 NON-COVERED ITEM OR SERVICE: HCPCS | Performed by: NEUROLOGICAL SURGERY

## 2019-04-07 PROCEDURE — 85025 COMPLETE CBC W/AUTO DIFF WBC: CPT

## 2019-04-07 PROCEDURE — 770001 HCHG ROOM/CARE - MED/SURG/GYN PRIV*

## 2019-04-07 PROCEDURE — 80053 COMPREHEN METABOLIC PANEL: CPT

## 2019-04-07 PROCEDURE — L0172 CERV COL SR FOAM 2PC PRE OTS: HCPCS

## 2019-04-07 PROCEDURE — 700101 HCHG RX REV CODE 250: Performed by: NEUROLOGICAL SURGERY

## 2019-04-07 PROCEDURE — 700112 HCHG RX REV CODE 229: Performed by: NURSE PRACTITIONER

## 2019-04-07 PROCEDURE — 99233 SBSQ HOSP IP/OBS HIGH 50: CPT | Performed by: SURGERY

## 2019-04-07 PROCEDURE — 71045 X-RAY EXAM CHEST 1 VIEW: CPT

## 2019-04-07 PROCEDURE — 306637 HCHG MISC ORTHO ITEM RC 0274

## 2019-04-07 PROCEDURE — 700111 HCHG RX REV CODE 636 W/ 250 OVERRIDE (IP): Performed by: NEUROLOGICAL SURGERY

## 2019-04-07 PROCEDURE — 70450 CT HEAD/BRAIN W/O DYE: CPT

## 2019-04-07 PROCEDURE — 700102 HCHG RX REV CODE 250 W/ 637 OVERRIDE(OP): Performed by: NURSE PRACTITIONER

## 2019-04-07 PROCEDURE — 700111 HCHG RX REV CODE 636 W/ 250 OVERRIDE (IP): Performed by: NURSE PRACTITIONER

## 2019-04-07 PROCEDURE — 700102 HCHG RX REV CODE 250 W/ 637 OVERRIDE(OP): Performed by: NEUROLOGICAL SURGERY

## 2019-04-07 RX ADMIN — OXYCODONE HYDROCHLORIDE 2.5 MG: 5 TABLET ORAL at 23:22

## 2019-04-07 RX ADMIN — ACETAMINOPHEN 1000 MG: 500 TABLET ORAL at 05:37

## 2019-04-07 RX ADMIN — GABAPENTIN 300 MG: 300 CAPSULE ORAL at 18:15

## 2019-04-07 RX ADMIN — FAMOTIDINE 20 MG: 20 TABLET ORAL at 05:37

## 2019-04-07 RX ADMIN — ACETAMINOPHEN 1000 MG: 500 TABLET ORAL at 18:15

## 2019-04-07 RX ADMIN — GABAPENTIN 300 MG: 300 CAPSULE ORAL at 05:37

## 2019-04-07 RX ADMIN — VITAMIN D, TAB 1000IU (100/BT) 5000 UNITS: 25 TAB at 05:36

## 2019-04-07 RX ADMIN — ACETAMINOPHEN 1000 MG: 500 TABLET ORAL at 23:18

## 2019-04-07 RX ADMIN — ACETAMINOPHEN 1000 MG: 500 TABLET ORAL at 12:02

## 2019-04-07 RX ADMIN — OXYCODONE HYDROCHLORIDE 2.5 MG: 5 TABLET ORAL at 13:25

## 2019-04-07 RX ADMIN — ANTACID TABLETS 500 MG: 500 TABLET, CHEWABLE ORAL at 05:38

## 2019-04-07 RX ADMIN — POLYETHYLENE GLYCOL 3350 1 PACKET: 17 POWDER, FOR SOLUTION ORAL at 18:16

## 2019-04-07 RX ADMIN — ANTACID TABLETS 500 MG: 500 TABLET, CHEWABLE ORAL at 18:16

## 2019-04-07 RX ADMIN — GABAPENTIN 300 MG: 300 CAPSULE ORAL at 12:02

## 2019-04-07 RX ADMIN — POTASSIUM CHLORIDE AND SODIUM CHLORIDE: 900; 150 INJECTION, SOLUTION INTRAVENOUS at 03:31

## 2019-04-07 RX ADMIN — DOCUSATE SODIUM 100 MG: 100 CAPSULE, LIQUID FILLED ORAL at 18:16

## 2019-04-07 RX ADMIN — ENOXAPARIN SODIUM 40 MG: 100 INJECTION SUBCUTANEOUS at 18:16

## 2019-04-07 RX ADMIN — ONDANSETRON 4 MG: 2 INJECTION INTRAMUSCULAR; INTRAVENOUS at 00:36

## 2019-04-07 RX ADMIN — DOCUSATE SODIUM 100 MG: 100 CAPSULE, LIQUID FILLED ORAL at 05:37

## 2019-04-07 ASSESSMENT — ENCOUNTER SYMPTOMS
BLURRED VISION: 0
HEADACHES: 0
SHORTNESS OF BREATH: 0
ABDOMINAL PAIN: 0
COUGH: 0
BACK PAIN: 1
SENSORY CHANGE: 1
VOMITING: 0
MYALGIAS: 1
DIZZINESS: 0
NAUSEA: 0
DOUBLE VISION: 0

## 2019-04-07 NOTE — ASSESSMENT & PLAN NOTE
Subtle findings raising concern for radial head fracture.   Patient states has old fracture. Minimal pain.   Non-operative management.  Weight bearing status - Weightbearing as tolerated CYNTHIA Bailey MD. Orthopedic Surgery.

## 2019-04-07 NOTE — ASSESSMENT & PLAN NOTE
Date of admission: 4/6  Date: 4/7 Transfer orders from SICU  Date: [facility]/SNF consult   Cleared for discharge: No  Discharge delayed: No    Discharge date: TBD

## 2019-04-07 NOTE — PROGRESS NOTES
Report called to SUZAN Ramos taking S-[id number]. All belongings, (phone and shower c-collar) transported with patient. Film room to create a disc of images for [place], sent with the patient.

## 2019-04-07 NOTE — PROGRESS NOTES
Neurosurgery Progress Note    Subjective:  Looks better this AM  GCS 15  R arm pain- possible minor fx  Tingling better.   Goode overnight.   CT shows luisa Hoffman.     Exam:  GCS 15  No weakness  [place] J and TLSO    BP  Min: 101/79  Max: 131/76  Pulse  Av.2  Min: 77  Max: 101  Resp  Av.5  Min: 14  Max: 30  Temp  Av.7 °C (98.1 °F)  Min: 35.6 °C (96.1 °F)  Max: 37.8 °C (100 °F)  Monitored Temp 2  Av.8 °C (100 °F)  Min: 37.6 °C (99.7 °F)  Max: 37.9 °C (100.2 °F)  SpO2  Av.3 %  Min: 89 %  Max: 100 %    No Data Recorded    Recent Labs      19   1427   WBC  18.2*   RBC  4.28   HEMOGLOBIN  13.1   HEMATOCRIT  38.4   MCV  89.7   MCH  30.6   MCHC  34.1   RDW  40.2   PLATELETCT  256   MPV  9.0     Recent Labs      19   1427  19   0453   SODIUM  144  141   POTASSIUM  2.6*  4.4   CHLORIDE  105  111   CO2  22  22   GLUCOSE  173*  125*   BUN  12  12   CREATININE  0.73  0.56   CALCIUM  9.3  8.5     Recent Labs      19   1427   APTT  27.6   INR  1.20*           Intake/Output       19 0700 - 19 0659 19 07 - 19 0659       Total  Total       Intake    P.O.  200  50 250  --  -- --    P.O. 200 50 250 -- -- --    I.V.  380  1200 1580  --  -- --    Pre-Hospital Volume 200 -- 200 -- -- --    Trauma Resuscitation Volume 100 -- 100 -- -- --    Volume (mL) (0.9 % NaCl with KCl 20 mEq infusion) 80 1200 1280 -- -- --    Blood  0  -- 0  --  -- --    PRBC Total Volume (Non-Barcoded) 0 -- 0 -- -- --    FFP Total Volume (Non-Barcoded) 0 -- 0 -- -- --    Platelets Total Volume (Non-Barcoded) 0 -- 0 -- -- --    Cryoprecipitate (Pooled) Total Volume (Non-Barcoded) 0 -- 0 -- -- --    Other  --  150 150  --  -- --    Medications (PO/Enteral Liquids) -- 150 150 -- -- --    IV Piggyback  100  -- 100  --  -- --    Volume (mL) (potassium chloride (KCL) ivpb 10 mEq) 100 -- 100 -- -- --    Total Intake 680 1400 2080 -- -- --       Output    Urine  --   875 875  --  -- --    Number of Times Voided 0 x 0 x 0 x -- -- --    Urine Void (mL) -- 0 0 -- -- --    Output (mL) (Urethral Catheter Temperature probe) -- 875 875 -- -- --    Other  0  -- 0  --  -- --    Pre-Hospital Output 0 -- 0 -- -- --    Trauma Resuscitation Output 0 -- 0 -- -- --    Stool  --  -- --  --  -- --    Number of Times Stooled 0 x 0 x 0 x -- -- --    Blood  0  -- 0  --  -- --    Est. Blood Loss 0 -- 0 -- -- --    Total Output 0 875 875 -- -- --       Net I/O      -- -- --            Intake/Output Summary (Last 24 hours) at 04/07/19 0722  Last data filed at 04/07/19 0600   Gross per 24 hour   Intake             2080 ml   Output              875 ml   Net             1205 ml       $ Bladder Scan Results (mL): 700    • Respiratory Care per Protocol   Continuous RT   • Pharmacy Consult Request  1 Each PHARMACY TO DOSE   • docusate sodium  100 mg BID   • senna-docusate  1 Tab Q24HRS PRN   • polyethylene glycol/lytes  1 Packet BID   • magnesium hydroxide  30 mL DAILY   • bisacodyl  10 mg Q24HRS PRN   • famotidine  20 mg BID    Or   • famotidine  20 mg BID   • ondansetron  4 mg Q4HRS PRN   • acetaminophen  650 mg Q4HRS PRN    Or   • acetaminophen  650 mg Q4HRS PRN   • acetaminophen  1,000 mg Q6HRS   • oxyCODONE immediate-release  2.5 mg Q3HRS PRN   • oxyCODONE immediate-release  5 mg Q3HRS PRN   • MD ALERT...DO NOT ADMINISTER NSAIDS or ASPIRIN unless ORDERED By Neurosurgery  1 Each PRN   • diphenhydrAMINE  25 mg Q6HRS PRN    Or   • diphenhydrAMINE  25 mg Q6HRS PRN   • methocarbamol  750 mg Q8HRS PRN   • LORazepam  0.5 mg Q6HRS PRN    Or   • LORazepam  0.5 mg Q6HRS PRN   • labetalol  10 mg Q HOUR PRN   • calcium carbonate  500 mg BID   • vitamin D  5,000 Units DAILY   • senna-docusate  1 Tab Q24HRS PRN   • fleet  1 Each Once PRN   • 0.9 % NaCl with KCl 20 mEq 1,000 mL   Continuous   • morphine injection  2 mg Q3HRS PRN   • gabapentin  300 mg TID       Assessment and Plan:  Hospital day #2  POD  #0  Prophylactic anticoagulation: yes         Start date/time: Today      Stable  1. Q4hrly neuro checks  2. lovenox ok- charted  3. OT/PT. Floor if trauma ok  4. Shower collar  5. Lovenox charted
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
## 2019-04-07 NOTE — ASSESSMENT & PLAN NOTE
4/6 Bladder scan with > 700 ml, freeman catheter placed.   4/8 Trial freeman removal.  4/9 Voiding

## 2019-04-07 NOTE — PROGRESS NOTES
Trauma / Surgical Daily Progress Note    Date of Service  4/7/2019    Chief Complaint  48 y.o. female admitted 4/6/2019 with Trauma    Interval Events  Neuro exam stable, numbness to 3 fingers on right, otherwise unremarkable  Neurosurgical and orthopedic recommendations reviewed  Respiratory status stable  Tertiary survey complete - no further findings    - Continue freeman, consider removal in AM  - PT/OT/SLP pending  - Stable for transfer to [facility]    Review of Systems  Review of Systems   Constitutional: Negative for malaise/fatigue.   HENT: Negative for hearing loss.    Eyes: Negative for blurred vision and double vision.   Respiratory: Negative for cough and shortness of breath.    Cardiovascular: Negative for chest pain and leg swelling.   Gastrointestinal: Negative for abdominal pain, nausea and vomiting.   Genitourinary:        Retention   Musculoskeletal: Positive for back pain and myalgias (Right shoulder).   Neurological: Positive for sensory change (Right thumb, pointer and index finger numbness). Negative for dizziness and headaches.        Vital Signs  Temp:  [35.6 °C (96.1 °F)-37.8 °C (100 °F)] 37.8 °C (100 °F)  Pulse:  [] 91  Resp:  [14-30] 15  BP: (101-131)/(45-85) 101/63  SpO2:  [89 %-100 %] 95 %    Physical Exam  Physical Exam   Constitutional: She is oriented to person, place, and time. She appears well-developed. No distress.   HENT:   Head: Normocephalic.   Eyes: Conjunctivae are normal.   Neck: Neck supple.   c-collar   Cardiovascular: Normal rate.    Pulmonary/Chest: Effort normal and breath sounds normal. No respiratory distress. She exhibits no tenderness.   Supplemental O2   Abdominal: Soft. Bowel sounds are normal. She exhibits no distension. There is no tenderness.   Genitourinary:   Genitourinary Comments: freeman   Musculoskeletal: She exhibits tenderness. She exhibits no edema.   Right shoulder pain, sling for comfort  TLSO at bedside   Neurological: She is alert and oriented to  person, place, and time.   Skin: Skin is warm and dry.   Psychiatric: She has a normal mood and affect. Her behavior is normal.   Nursing note and vitals reviewed.      Laboratory  Recent Results (from the past 24 hour(s))   DIAGNOSTIC ALCOHOL    Collection Time: 04/06/19  2:27 PM   Result Value Ref Range    Diagnostic Alcohol 0.01 (H) 0.00 g/dL   CBC WITHOUT DIFFERENTIAL    Collection Time: 04/06/19  2:27 PM   Result Value Ref Range    WBC 18.2 (H) 4.8 - 10.8 K/uL    RBC 4.28 4.20 - 5.40 M/uL    Hemoglobin 13.1 12.0 - 16.0 g/dL    Hematocrit 38.4 37.0 - 47.0 %    MCV 89.7 81.4 - 97.8 fL    MCH 30.6 27.0 - 33.0 pg    MCHC 34.1 33.6 - 35.0 g/dL    RDW 40.2 35.9 - 50.0 fL    Platelet Count 256 164 - 446 K/uL    MPV 9.0 9.0 - 12.9 fL   Comp Metabolic Panel    Collection Time: 04/06/19  2:27 PM   Result Value Ref Range    Sodium 144 135 - 145 mmol/L    Potassium 2.6 (LL) 3.6 - 5.5 mmol/L    Chloride 105 96 - 112 mmol/L    Co2 22 20 - 33 mmol/L    Anion Gap 17.0 (H) 0.0 - 11.9    Glucose 173 (H) 65 - 99 mg/dL    Bun 12 8 - 22 mg/dL    Creatinine 0.73 0.50 - 1.40 mg/dL    Calcium 9.3 8.5 - 10.5 mg/dL    AST(SGOT) 53 (H) 12 - 45 U/L    ALT(SGPT) 51 (H) 2 - 50 U/L    Alkaline Phosphatase 94 30 - 99 U/L    Total Bilirubin 0.6 0.1 - 1.5 mg/dL    Albumin 4.4 3.2 - 4.9 g/dL    Total Protein 7.1 6.0 - 8.2 g/dL    Globulin 2.7 1.9 - 3.5 g/dL    A-G Ratio 1.6 g/dL   Prothrombin Time    Collection Time: 04/06/19  2:27 PM   Result Value Ref Range    PT 15.3 (H) 12.0 - 14.6 sec    INR 1.20 (H) 0.87 - 1.13   APTT    Collection Time: 04/06/19  2:27 PM   Result Value Ref Range    APTT 27.6 24.7 - 36.0 sec   HCG QUAL SERUM    Collection Time: 04/06/19  2:27 PM   Result Value Ref Range    Beta-Hcg Qualitative Serum Negative Negative   COD - Adult (Type and Screen)    Collection Time: 04/06/19  2:27 PM   Result Value Ref Range    ABO Grouping Only O     Rh Grouping Only POS     Antibody Screen-Cod NEG    ESTIMATED GFR    Collection Time:  19  2:27 PM   Result Value Ref Range    GFR If African American >60 >60 mL/min/1.73 m 2    GFR If Non African American >60 >60 mL/min/1.73 m 2   EKG    Collection Time: 19  4:29 PM   Result Value Ref Range    Report       [facility]    Test Date:  2019  Pt Name:    DEGREE THIRTY-SIX            Department: ER  MRN:        [id number]                      Room:       [facility]  Gender:     Female                       Technician: TXM  :                                     Requested By:BONNIE COLLAZO  Order #:    [id number]                    Reading MD: Dante Pittman MD    Measurements  Intervals                                Axis  Rate:       81                           P:          40  MD:         180                          QRS:        66  QRSD:       86                           T:          -25  QT:         400  QTc:        465    Interpretive Statements  SINUS RHYTHM  NONSPECIFIC T ABNORMALITIES, LATERAL LEADS  No previous ECG available for comparison    Electronically Signed On 2019 18:42:19 PDT by Dante Pittman MD     CBC with Differential: Tomorrow AM    Collection Time: 19  4:53 AM   Result Value Ref Range    WBC 13.2 (H) 4.8 - 10.8 K/uL    RBC 3.46 (L) 4.20 - 5.40 M/uL    Hemoglobin 10.7 (L) 12.0 - 16.0 g/dL    Hematocrit 32.2 (L) 37.0 - 47.0 %    MCV 93.1 81.4 - 97.8 fL    MCH 30.9 27.0 - 33.0 pg    MCHC 33.2 (L) 33.6 - 35.0 g/dL    RDW 42.9 35.9 - 50.0 fL    Platelet Count 161 (L) 164 - 446 K/uL    MPV 9.3 9.0 - 12.9 fL    Neutrophils-Polys 84.60 (H) 44.00 - 72.00 %    Lymphocytes 9.60 (L) 22.00 - 41.00 %    Monocytes 5.00 0.00 - 13.40 %    Eosinophils 0.00 0.00 - 6.90 %    Basophils 0.10 0.00 - 1.80 %    Immature Granulocytes 0.70 0.00 - 0.90 %    Nucleated RBC 0.00 /100 WBC    Neutrophils (Absolute) 11.14 (H) 2.00 - 7.15 K/uL    Lymphs (Absolute) 1.26 1.00 - 4.80 K/uL    Monos (Absolute) 0.66 0.00 - 0.85 K/uL    Eos (Absolute) 0.00 0.00 - 0.51 K/uL    Baso (Absolute)  0.01 0.00 - 0.12 K/uL    Immature Granulocytes (abs) 0.09 0.00 - 0.11 K/uL    NRBC (Absolute) 0.00 K/uL   Comp Metabolic Panel (CMP): Tomorrow AM    Collection Time: 04/07/19  4:53 AM   Result Value Ref Range    Sodium 141 135 - 145 mmol/L    Potassium 4.4 3.6 - 5.5 mmol/L    Chloride 111 96 - 112 mmol/L    Co2 22 20 - 33 mmol/L    Anion Gap 8.0 0.0 - 11.9    Glucose 125 (H) 65 - 99 mg/dL    Bun 12 8 - 22 mg/dL    Creatinine 0.56 0.50 - 1.40 mg/dL    Calcium 8.5 8.5 - 10.5 mg/dL    AST(SGOT) 60 (H) 12 - 45 U/L    ALT(SGPT) 44 2 - 50 U/L    Alkaline Phosphatase 49 30 - 99 U/L    Total Bilirubin 0.6 0.1 - 1.5 mg/dL    Albumin 3.5 3.2 - 4.9 g/dL    Total Protein 5.7 (L) 6.0 - 8.2 g/dL    Globulin 2.2 1.9 - 3.5 g/dL    A-G Ratio 1.6 g/dL   ESTIMATED GFR    Collection Time: 04/07/19  4:53 AM   Result Value Ref Range    GFR If African American >60 >60 mL/min/1.73 m 2    GFR If Non African American >60 >60 mL/min/1.73 m 2       Fluids    Intake/Output Summary (Last 24 hours) at 04/07/19 1201  Last data filed at 04/07/19 1000   Gross per 24 hour   Intake             2480 ml   Output              950 ml   Net             1530 ml       Core Measures & Quality Metrics  Medications reviewed, Labs reviewed and Radiology images reviewed  Goode catheter: Urinary Tract Retention or Urinary Tract Obstruction      DVT Prophylaxis: Enoxaparin (Lovenox)  DVT prophylaxis - mechanical: SCDs  Ulcer prophylaxis: Not indicated    Assessed for rehab: Patient returned to prior level of function, rehabilitation not indicated at this time    Total Score: 9    ETOH Screening     Intervention complete date: 4/7/2019  Patient response to intervention: Drinks occasionally, denies tobacco or illicit drug use..   Plan of care: No intervention         Assessment/Plan  C7 cervical fracture (HCC)- (present on admission)   Assessment & Plan    Fracture of the right C7 facet at the superior aspect. There is anterior displacement of that fracture component  resulting in moderate narrowing of the right C6-7 neuroforamen.  Non-operative management. [place] J collar for 6 weeks.  La Nena Tian MD. Neurosurgery.      Compression fracture of vertebra (HCC)- (present on admission)   Assessment & Plan    Mild acute compression fractures at T7, T8, T11 and T12 with minimal loss of height and at T7, T11 and T12.  Non-operative management.   TLSO when she sits up more than 30 degrees or when she is out of bed for 6 weeks.  La Nena Tian MD. Neurosurgery.      Urinary retention- (present on admission)   Assessment & Plan    4/6 Bladder scan with > 700 ml, freeman catheter placed.   4/8 Consider trial freeman removal.     Closed fracture of head of right radius- (present on admission)   Assessment & Plan    Subtle findings raising concern for radial head fracture.   Patient states has old fracture. Minimal pain.   Non-operative management.  Weight bearing status - Weightbearing as tolerated RUE.  James Bailey MD. Orthopedic Surgery.     Closed fracture of greater tuberosity of right humerus- (present on admission)   Assessment & Plan    Probable subtle right greater tuberosity fracture. No right shoulder dislocation.  Definitive plan pending.  Weight bearing status - Weightbearing as tolerated RUE. Sling for comfort. OK for ADLS as tolerated  Follow up outpatient 1-2 weeks for repeat films  James Bailey MD. Orthopedic Surgery.     Traumatic subarachnoid hemorrhage with loss of consciousness of 30 minutes or less (HCC)- (present on admission)   Assessment & Plan    Possible traumatic subarachnoid hemorrhage left side posterior fossa.  Repeat head CT stable.  Non-operative management.   Post traumatic pharmacologic seizure prophylaxis not indicated.  SLP for cog eval.  La Nena Tian MD. Neurosurgery.      Fracture of manubrium- (present on admission)   Assessment & Plan    Minimally displaced fracture of the anterior aspect of the manubrium, adjacent the sternomanubrial junction.  Aggressive  pulmonary hygiene and multimodal pain management.      Contraindication to deep vein thrombosis (DVT) prophylaxis- (present on admission)   Assessment & Plan    Systemic anticoagulation contraindicated secondary to elevated bleeding risk.  Chemical DVT prophylaxis (Lovenox) initiated, 40mg daily per neurosurgery.  Ambulate TID.  Trauma duplex as clinically indicated.     Discharge planning issues- (present on admission)   Assessment & Plan    Date of admission: 4/6  Date: 4/7 Transfer orders from SICU  Date: [facility] Rehab/SNF consult   Cleared for discharge: No  Discharge delayed: No    Discharge date: TBD     Trauma- (present on admission)   Assessment & Plan    Skier vs tree, helmeted, positive LOC.  Trauma Red Activation.  Shree Darnell MD. Trauma Surgery.          Discussed patient condition with RN, Patient and trauma surgery. Dr. Mancini  Patient seen, data reviewed and discussed.  Agree with assessment and plan.   The APN and I have collaborated in the patient assessment chart documentation and care plan. The clinical decision making , diagnoses and management are mine and the APN has assisted in the creation of the clinical document . The APN has no independent billing for this patient.      I have personally evaluated this patient at the bedside and performed a global high level assessment to allow clinical decision making regarding the patient's risk tolerance for transfer to a lower level of care in this in-house risk environment. This decision and takes into account the patient's current active clinical problems and  Comorbidities. This includes:  Complete neurologic assessment  Assessment of respiratory function considering the need her ongoing therapies and the patient's tolerance'  Cardiovascular status  Coordination of care needs

## 2019-04-07 NOTE — PROGRESS NOTES
"Trauma Progress Note     Briefly, this is a 48 y.o. female with a polytrauma after colliding with a tree while skiing.    /85   Pulse 94   Temp 37.8 °C (100 °F) (Temporal)   Resp 18   Ht 1.626 m (5' 4\")   Wt 63.5 kg (139 lb 15.9 oz)   SpO2 93%   Breastfeeding? No   BMI 24.03 kg/m²       Intake/Output Summary (Last 24 hours) at 04/07/19 0559  Last data filed at 04/07/19 0400   Gross per 24 hour   Intake             1780 ml   Output              675 ml   Net             1105 ml       Lab Results   Component Value Date/Time    WBC 18.2 (H) 04/06/2019 02:27 PM    RBC 4.28 04/06/2019 02:27 PM    HEMOGLOBIN 13.1 04/06/2019 02:27 PM    HEMATOCRIT 38.4 04/06/2019 02:27 PM    MCV 89.7 04/06/2019 02:27 PM    MCH 30.6 04/06/2019 02:27 PM    MCHC 34.1 04/06/2019 02:27 PM    MPV 9.0 04/06/2019 02:27 PM        Lab Results   Component Value Date/Time    SODIUM 141 04/07/2019 04:53 AM    POTASSIUM 4.4 04/07/2019 04:53 AM    CHLORIDE 111 04/07/2019 04:53 AM    CO2 22 04/07/2019 04:53 AM    GLUCOSE 125 (H) 04/07/2019 04:53 AM    BUN 12 04/07/2019 04:53 AM    CREATININE 0.56 04/07/2019 04:53 AM        Lab Results   Component Value Date/Time    PROTHROMBTM 15.3 (H) 04/06/2019 02:27 PM    INR 1.20 (H) 04/06/2019 02:27 PM        Assessment:  Sitting at edge of bed with nursing staff  Goode catheter placed overnight for retention  Serum potassium normalized  Repeat head CT stable    Additional Plans:  Continue with current plan of care  Awaiting further orthopedic recommendations regarding RUE fractures  Awaiting delivery of   PT/OT, SLP      "

## 2019-04-07 NOTE — PROGRESS NOTES
Mobile collar (shower) was ordered to [facility] ([phone]).  If any further assistance needed, please call extension [phone] or place order for Ortho Technician assistance as a communication order in [employer]

## 2019-04-07 NOTE — ASSESSMENT & PLAN NOTE
Probable subtle right greater tuberosity fracture. No right shoulder dislocation.  Non-operative management.  Weight bearing status - Weightbearing as tolerated RUE. Sling for comfort. OK for ADLS as tolerated  Follow up outpatient 1-2 weeks for repeat films.  James Bailey MD. Orthopedic Surgery.

## 2019-04-07 NOTE — PROGRESS NOTES
2 RN skin check complete.   - Bilateral ankle abrasions  - Forehead lac, open to air.   - no other areas of concern.

## 2019-04-07 NOTE — PROGRESS NOTES
Dr. Tian at bedside. Ok to transfer to floor from neurosurgery standpoint. Lovenox scheduled to start tonight. No need to log roll patient into and out of TLSO brace. Ok to sit greater than 30 in bed without TLSO brace. TLSO ok to be off for showers, shower c-collar to be worn for showers, shower collar ordered.

## 2019-04-07 NOTE — PROGRESS NOTES
Dr. Bailey with ortho at bedside. No surgery. Sling for comfort. Confirmed ok to attach arm to front of TLSO .

## 2019-04-07 NOTE — CARE PLAN
Problem: Safety  Goal: Will remain free from injury  Outcome: PROGRESSING AS EXPECTED  Bed is locked and in low position. Bed alarm is on. Patient is close to nursing station. Call light is within reach and educated on how to use it.     Problem: Skin Integrity  Goal: Risk for impaired skin integrity will decrease  Outcome: PROGRESSING AS EXPECTED  Patient is turned every 2 hours. Pillows in use to float heels. Pillows under elbows.

## 2019-04-07 NOTE — CARE PLAN
Problem: Pain Management  Goal: Pain level will decrease to patient's comfort goal    Intervention: Follow pain managment plan developed in collaboration with patient and Interdisciplinary Team  Assessed for pain and medicated per the MAR.       Problem: Skin Integrity  Goal: Risk for impaired skin integrity will decrease    Intervention: Assess risk factors for impaired skin integrity and/or pressure ulcers  Assessed. Appropriate preventative measures in place.

## 2019-04-08 ENCOUNTER — APPOINTMENT (OUTPATIENT)
Dept: RADIOLOGY | Facility: MEDICAL CENTER | Age: 49
DRG: 551 | End: 2019-04-08
Attending: NURSE PRACTITIONER
Payer: COMMERCIAL

## 2019-04-08 PROBLEM — Z78.9 NO CONTRAINDICATION TO DEEP VEIN THROMBOSIS (DVT) PROPHYLAXIS: Status: ACTIVE | Noted: 2019-04-06

## 2019-04-08 LAB
ALBUMIN SERPL BCP-MCNC: 3.6 G/DL (ref 3.2–4.9)
ALBUMIN/GLOB SERPL: 1.6 G/DL
ALP SERPL-CCNC: 48 U/L (ref 30–99)
ALT SERPL-CCNC: 41 U/L (ref 2–50)
ANION GAP SERPL CALC-SCNC: 9 MMOL/L (ref 0–11.9)
AST SERPL-CCNC: 47 U/L (ref 12–45)
BASOPHILS # BLD AUTO: 0.2 % (ref 0–1.8)
BASOPHILS # BLD: 0.02 K/UL (ref 0–0.12)
BILIRUB SERPL-MCNC: 0.8 MG/DL (ref 0.1–1.5)
BUN SERPL-MCNC: 9 MG/DL (ref 8–22)
CALCIUM SERPL-MCNC: 8.4 MG/DL (ref 8.5–10.5)
CHLORIDE SERPL-SCNC: 103 MMOL/L (ref 96–112)
CO2 SERPL-SCNC: 23 MMOL/L (ref 20–33)
CREAT SERPL-MCNC: 0.49 MG/DL (ref 0.5–1.4)
EOSINOPHIL # BLD AUTO: 0.1 K/UL (ref 0–0.51)
EOSINOPHIL NFR BLD: 1 % (ref 0–6.9)
ERYTHROCYTE [DISTWIDTH] IN BLOOD BY AUTOMATED COUNT: 40 FL (ref 35.9–50)
GLOBULIN SER CALC-MCNC: 2.2 G/DL (ref 1.9–3.5)
GLUCOSE SERPL-MCNC: 110 MG/DL (ref 65–99)
HCT VFR BLD AUTO: 30.7 % (ref 37–47)
HGB BLD-MCNC: 10.6 G/DL (ref 12–16)
IMM GRANULOCYTES # BLD AUTO: 0.03 K/UL (ref 0–0.11)
IMM GRANULOCYTES NFR BLD AUTO: 0.3 % (ref 0–0.9)
LYMPHOCYTES # BLD AUTO: 2.24 K/UL (ref 1–4.8)
LYMPHOCYTES NFR BLD: 23.3 % (ref 22–41)
MAGNESIUM SERPL-MCNC: 1.8 MG/DL (ref 1.5–2.5)
MCH RBC QN AUTO: 30.8 PG (ref 27–33)
MCHC RBC AUTO-ENTMCNC: 34.5 G/DL (ref 33.6–35)
MCV RBC AUTO: 89.2 FL (ref 81.4–97.8)
MONOCYTES # BLD AUTO: 0.45 K/UL (ref 0–0.85)
MONOCYTES NFR BLD AUTO: 4.7 % (ref 0–13.4)
NEUTROPHILS # BLD AUTO: 6.79 K/UL (ref 2–7.15)
NEUTROPHILS NFR BLD: 70.5 % (ref 44–72)
NRBC # BLD AUTO: 0 K/UL
NRBC BLD-RTO: 0 /100 WBC
PHOSPHATE SERPL-MCNC: 2.1 MG/DL (ref 2.5–4.5)
PLATELET # BLD AUTO: 124 K/UL (ref 164–446)
PMV BLD AUTO: 9 FL (ref 9–12.9)
POTASSIUM SERPL-SCNC: 3.9 MMOL/L (ref 3.6–5.5)
PROT SERPL-MCNC: 5.8 G/DL (ref 6–8.2)
RBC # BLD AUTO: 3.44 M/UL (ref 4.2–5.4)
SODIUM SERPL-SCNC: 135 MMOL/L (ref 135–145)
WBC # BLD AUTO: 9.6 K/UL (ref 4.8–10.8)

## 2019-04-08 PROCEDURE — A9270 NON-COVERED ITEM OR SERVICE: HCPCS | Performed by: NEUROLOGICAL SURGERY

## 2019-04-08 PROCEDURE — 71045 X-RAY EXAM CHEST 1 VIEW: CPT

## 2019-04-08 PROCEDURE — 36415 COLL VENOUS BLD VENIPUNCTURE: CPT

## 2019-04-08 PROCEDURE — 700111 HCHG RX REV CODE 636 W/ 250 OVERRIDE (IP): Performed by: NEUROLOGICAL SURGERY

## 2019-04-08 PROCEDURE — 700102 HCHG RX REV CODE 250 W/ 637 OVERRIDE(OP): Performed by: PHYSICIAN ASSISTANT

## 2019-04-08 PROCEDURE — 97162 PT EVAL MOD COMPLEX 30 MIN: CPT

## 2019-04-08 PROCEDURE — 83735 ASSAY OF MAGNESIUM: CPT

## 2019-04-08 PROCEDURE — A9270 NON-COVERED ITEM OR SERVICE: HCPCS | Performed by: NURSE PRACTITIONER

## 2019-04-08 PROCEDURE — 92523 SPEECH SOUND LANG COMPREHEN: CPT

## 2019-04-08 PROCEDURE — 97166 OT EVAL MOD COMPLEX 45 MIN: CPT

## 2019-04-08 PROCEDURE — 700102 HCHG RX REV CODE 250 W/ 637 OVERRIDE(OP): Performed by: NURSE PRACTITIONER

## 2019-04-08 PROCEDURE — A9270 NON-COVERED ITEM OR SERVICE: HCPCS | Performed by: PHYSICIAN ASSISTANT

## 2019-04-08 PROCEDURE — 770001 HCHG ROOM/CARE - MED/SURG/GYN PRIV*

## 2019-04-08 PROCEDURE — 700102 HCHG RX REV CODE 250 W/ 637 OVERRIDE(OP): Performed by: NEUROLOGICAL SURGERY

## 2019-04-08 PROCEDURE — 700112 HCHG RX REV CODE 229: Performed by: NURSE PRACTITIONER

## 2019-04-08 PROCEDURE — 700111 HCHG RX REV CODE 636 W/ 250 OVERRIDE (IP): Performed by: NURSE PRACTITIONER

## 2019-04-08 PROCEDURE — 85025 COMPLETE CBC W/AUTO DIFF WBC: CPT

## 2019-04-08 PROCEDURE — 84100 ASSAY OF PHOSPHORUS: CPT

## 2019-04-08 PROCEDURE — 80053 COMPREHEN METABOLIC PANEL: CPT

## 2019-04-08 RX ORDER — GABAPENTIN 300 MG/1
600 CAPSULE ORAL 2 TIMES DAILY
Status: DISCONTINUED | OUTPATIENT
Start: 2019-04-08 | End: 2019-04-10 | Stop reason: HOSPADM

## 2019-04-08 RX ADMIN — ACETAMINOPHEN 1000 MG: 500 TABLET ORAL at 22:34

## 2019-04-08 RX ADMIN — ENOXAPARIN SODIUM 40 MG: 100 INJECTION SUBCUTANEOUS at 18:27

## 2019-04-08 RX ADMIN — DOCUSATE SODIUM 100 MG: 100 CAPSULE, LIQUID FILLED ORAL at 18:26

## 2019-04-08 RX ADMIN — ACETAMINOPHEN 1000 MG: 500 TABLET ORAL at 12:54

## 2019-04-08 RX ADMIN — DOCUSATE SODIUM 100 MG: 100 CAPSULE, LIQUID FILLED ORAL at 04:18

## 2019-04-08 RX ADMIN — ONDANSETRON 4 MG: 2 INJECTION INTRAMUSCULAR; INTRAVENOUS at 14:08

## 2019-04-08 RX ADMIN — OXYCODONE HYDROCHLORIDE 2.5 MG: 5 TABLET ORAL at 10:14

## 2019-04-08 RX ADMIN — ACETAMINOPHEN 1000 MG: 500 TABLET ORAL at 04:19

## 2019-04-08 RX ADMIN — GABAPENTIN 300 MG: 300 CAPSULE ORAL at 04:19

## 2019-04-08 RX ADMIN — GABAPENTIN 600 MG: 300 CAPSULE ORAL at 18:26

## 2019-04-08 RX ADMIN — ANTACID TABLETS 500 MG: 500 TABLET, CHEWABLE ORAL at 18:26

## 2019-04-08 RX ADMIN — ONDANSETRON 4 MG: 2 INJECTION INTRAMUSCULAR; INTRAVENOUS at 18:21

## 2019-04-08 RX ADMIN — VITAMIN D, TAB 1000IU (100/BT) 5000 UNITS: 25 TAB at 04:18

## 2019-04-08 RX ADMIN — POLYETHYLENE GLYCOL 3350 1 PACKET: 17 POWDER, FOR SOLUTION ORAL at 18:27

## 2019-04-08 RX ADMIN — OXYCODONE HYDROCHLORIDE 5 MG: 5 TABLET ORAL at 13:22

## 2019-04-08 RX ADMIN — METHOCARBAMOL TABLETS 750 MG: 750 TABLET, COATED ORAL at 12:54

## 2019-04-08 RX ADMIN — ANTACID TABLETS 500 MG: 500 TABLET, CHEWABLE ORAL at 04:19

## 2019-04-08 RX ADMIN — ACETAMINOPHEN 1000 MG: 500 TABLET ORAL at 18:26

## 2019-04-08 ASSESSMENT — COGNITIVE AND FUNCTIONAL STATUS - GENERAL
MOVING FROM LYING ON BACK TO SITTING ON SIDE OF FLAT BED: A LITTLE
PERSONAL GROOMING: A LITTLE
SUGGESTED CMS G CODE MODIFIER DAILY ACTIVITY: CK
TURNING FROM BACK TO SIDE WHILE IN FLAT BAD: A LOT
SUGGESTED CMS G CODE MODIFIER MOBILITY: CL
DRESSING REGULAR LOWER BODY CLOTHING: A LOT
DRESSING REGULAR UPPER BODY CLOTHING: A LOT
CLIMB 3 TO 5 STEPS WITH RAILING: A LOT
DAILY ACTIVITIY SCORE: 14
EATING MEALS: A LITTLE
MOBILITY SCORE: 14
TOILETING: A LOT
WALKING IN HOSPITAL ROOM: A LITTLE
MOVING TO AND FROM BED TO CHAIR: UNABLE
HELP NEEDED FOR BATHING: A LOT
STANDING UP FROM CHAIR USING ARMS: A LITTLE

## 2019-04-08 ASSESSMENT — ENCOUNTER SYMPTOMS
VOMITING: 0
ABDOMINAL PAIN: 0
FEVER: 0
SENSORY CHANGE: 1
DOUBLE VISION: 0
NAUSEA: 0
MYALGIAS: 1
BACK PAIN: 1
CHILLS: 0
BLURRED VISION: 0

## 2019-04-08 ASSESSMENT — ACTIVITIES OF DAILY LIVING (ADL): TOILETING: INDEPENDENT

## 2019-04-08 ASSESSMENT — GAIT ASSESSMENTS
GAIT LEVEL OF ASSIST: MINIMAL ASSIST
DEVIATION: BRADYKINETIC
ASSISTIVE DEVICE: HAND HELD ASSIST
DISTANCE (FEET): 75

## 2019-04-08 NOTE — PROGRESS NOTES
Trauma / Surgical Daily Progress Note    Date of Service  4/8/2019    Chief Complaint  48 y.o. female admitted 4/6/2019 with Trauma    Interval Events  Transferred from ICU to neurosurgery  Trial Goode removal    - Therapy evaluations pending  - Disposition pending therapy evaluations    Review of Systems  Review of Systems   Constitutional: Negative for chills and fever.   Eyes: Negative for blurred vision and double vision.   Cardiovascular: Negative for chest pain.   Gastrointestinal: Negative for abdominal pain, nausea and vomiting.        4/5 BM   Musculoskeletal: Positive for back pain, joint pain (right shoulder) and myalgias.   Neurological: Positive for sensory change (right arm).        Vital Signs  Temp:  [36.8 °C (98.3 °F)-37.4 °C (99.4 °F)] 36.8 °C (98.3 °F)  Pulse:  [] 90  Resp:  [15-20] 16  BP: (105-136)/(70-83) 129/80  SpO2:  [91 %-98 %] 98 %    Physical Exam  Physical Exam   Constitutional: She is oriented to person, place, and time. No distress. Cervical collar in place.   HENT:   Head: Normocephalic.   Eyes: Conjunctivae are normal.   Cardiovascular: Normal rate and intact distal pulses.    Pulmonary/Chest: Effort normal and breath sounds normal. No respiratory distress. She exhibits no tenderness.   Abdominal: Soft. Bowel sounds are normal. She exhibits no distension. There is no tenderness.   Genitourinary:   Genitourinary Comments: Goode to gravity   Musculoskeletal: She exhibits tenderness. She exhibits no edema.   Right shoulder pain, sling for comfort  TLSO at bedside    Neurological: She is alert and oriented to person, place, and time.   Skin: Skin is warm and dry.   Nursing note and vitals reviewed.      Laboratory  Recent Results (from the past 24 hour(s))   Magnesium: Every Monday and Thursday AM    Collection Time: 04/08/19  2:28 AM   Result Value Ref Range    Magnesium 1.8 1.5 - 2.5 mg/dL   Phosphorus: Every Monday and Thursday AM    Collection Time: 04/08/19  2:28 AM   Result  Value Ref Range    Phosphorus 2.1 (L) 2.5 - 4.5 mg/dL   CBC with Differential: Tomorrow AM    Collection Time: 04/08/19  2:28 AM   Result Value Ref Range    WBC 9.6 4.8 - 10.8 K/uL    RBC 3.44 (L) 4.20 - 5.40 M/uL    Hemoglobin 10.6 (L) 12.0 - 16.0 g/dL    Hematocrit 30.7 (L) 37.0 - 47.0 %    MCV 89.2 81.4 - 97.8 fL    MCH 30.8 27.0 - 33.0 pg    MCHC 34.5 33.6 - 35.0 g/dL    RDW 40.0 35.9 - 50.0 fL    Platelet Count 124 (L) 164 - 446 K/uL    MPV 9.0 9.0 - 12.9 fL    Neutrophils-Polys 70.50 44.00 - 72.00 %    Lymphocytes 23.30 22.00 - 41.00 %    Monocytes 4.70 0.00 - 13.40 %    Eosinophils 1.00 0.00 - 6.90 %    Basophils 0.20 0.00 - 1.80 %    Immature Granulocytes 0.30 0.00 - 0.90 %    Nucleated RBC 0.00 /100 WBC    Neutrophils (Absolute) 6.79 2.00 - 7.15 K/uL    Lymphs (Absolute) 2.24 1.00 - 4.80 K/uL    Monos (Absolute) 0.45 0.00 - 0.85 K/uL    Eos (Absolute) 0.10 0.00 - 0.51 K/uL    Baso (Absolute) 0.02 0.00 - 0.12 K/uL    Immature Granulocytes (abs) 0.03 0.00 - 0.11 K/uL    NRBC (Absolute) 0.00 K/uL   Comp Metabolic Panel    Collection Time: 04/08/19  2:28 AM   Result Value Ref Range    Sodium 135 135 - 145 mmol/L    Potassium 3.9 3.6 - 5.5 mmol/L    Chloride 103 96 - 112 mmol/L    Co2 23 20 - 33 mmol/L    Anion Gap 9.0 0.0 - 11.9    Glucose 110 (H) 65 - 99 mg/dL    Bun 9 8 - 22 mg/dL    Creatinine 0.49 (L) 0.50 - 1.40 mg/dL    Calcium 8.4 (L) 8.5 - 10.5 mg/dL    AST(SGOT) 47 (H) 12 - 45 U/L    ALT(SGPT) 41 2 - 50 U/L    Alkaline Phosphatase 48 30 - 99 U/L    Total Bilirubin 0.8 0.1 - 1.5 mg/dL    Albumin 3.6 3.2 - 4.9 g/dL    Total Protein 5.8 (L) 6.0 - 8.2 g/dL    Globulin 2.2 1.9 - 3.5 g/dL    A-G Ratio 1.6 g/dL   ESTIMATED GFR    Collection Time: 04/08/19  2:28 AM   Result Value Ref Range    GFR If African American >60 >60 mL/min/1.73 m 2    GFR If Non African American >60 >60 mL/min/1.73 m 2       Fluids    Intake/Output Summary (Last 24 hours) at 04/08/19 1055  Last data filed at 04/07/19 1400   Gross per  24 hour   Intake              800 ml   Output              175 ml   Net              625 ml       Core Measures & Quality Metrics  Labs reviewed, Medications reviewed and Radiology images reviewed  Freeman catheter: No Freeman      DVT Prophylaxis: Enoxaparin (Lovenox)  DVT prophylaxis - mechanical: SCDs  Ulcer prophylaxis: Not indicated    Assessed for rehab: Patient was assess for and/or received rehabilitation services during this hospitalization    Total Score: 9    ETOH Screening     Intervention complete date: 4/7/2019  Patient response to intervention: Drinks occasionally, denies tobacco or illicit drug use..   Plan of care: No intervention         Assessment/Plan  C7 cervical fracture (HCC)- (present on admission)   Assessment & Plan    Fracture of the right C7 facet at the superior aspect. There is anterior displacement of that fracture component resulting in moderate narrowing of the right C6-7 neuroforamen.  Non-operative management. [place] J collar for 6 weeks.  La Nena Tian MD. Neurosurgery.       Compression fracture of vertebra (HCC)- (present on admission)   Assessment & Plan    Mild acute compression fractures at T7, T8, T11 and T12 with minimal loss of height and at T7, T11 and T12.  Non-operative management.   TLSO when she sits up more than 30 degrees or when she is out of bed for 6 weeks.  La Nena Tian MD. Neurosurgery.       Urinary retention- (present on admission)   Assessment & Plan    4/6 Bladder scan with > 700 ml, freeman catheter placed.   4/8 Trial freeman removal.     Closed fracture of head of right radius- (present on admission)   Assessment & Plan    Subtle findings raising concern for radial head fracture.   Patient states has old fracture. Minimal pain.   Non-operative management.  Weight bearing status - Weightbearing as tolerated CYNTHIA Bailey MD. Orthopedic Surgery.      Closed fracture of greater tuberosity of right humerus- (present on admission)   Assessment & Plan    Probable  subtle right greater tuberosity fracture. No right shoulder dislocation.  Non-operative management.  Weight bearing status - Weightbearing as tolerated RUE. Sling for comfort. OK for ADLS as tolerated  Follow up outpatient 1-2 weeks for repeat films  James Bailey MD. Orthopedic Surgery.     Traumatic subarachnoid hemorrhage with loss of consciousness of 30 minutes or less (HCC)- (present on admission)   Assessment & Plan    Possible traumatic subarachnoid hemorrhage left side posterior fossa.  Repeat head CT stable.  Non-operative management.   Post traumatic pharmacologic seizure prophylaxis not indicated.  SLP for cog eval.  La Nena Tian MD. Neurosurgery.       Fracture of manubrium- (present on admission)   Assessment & Plan    Minimally displaced fracture of the anterior aspect of the manubrium, adjacent the sternomanubrial junction.  Aggressive pulmonary hygiene and multimodal pain management.       Discharge planning issues- (present on admission)   Assessment & Plan    Date of admission: 4/6  Date: 4/7 Transfer orders from SICU  Date: [facility] Rehab/SNF consult   Cleared for discharge: No  Discharge delayed: No    Discharge date: TBD     No contraindication to deep vein thrombosis (DVT) prophylaxis- (present on admission)   Assessment & Plan    Systemic anticoagulation contraindicated secondary to elevated bleeding risk.  4/7 Chemical DVT prophylaxis (Lovenox) initiated, 40mg daily per neurosurgery.  Ambulate TID.  Trauma duplex as clinically indicated.     Trauma- (present on admission)   Assessment & Plan    Skier vs tree, helmeted, positive LOC.  Trauma Red Activation.  Shree Darnell MD. Trauma Surgery.           Discussed patient condition with RN, Patient and orthopedics and trauma surgery, Dr. Mancini and [facility] ortho PA-C.    Patient seen, data reviewed and discussed.  Agree with assessment and plan.   The APN and I have collaborated in the patient assessment chart documentation and care plan. The clinical  decision making , diagnoses and management are mine and the APN has assisted in the creation of the clinical document . The APN has no independent billing for this patient.
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
## 2019-04-08 NOTE — PROGRESS NOTES
Pt arrived to unit via ICU RNs, pt oriented to room and call light system, all questions answered, bed locked and in lowest position, call light within reach. Hourly rounding in place.   Pt assessment congruent with previous RN.

## 2019-04-08 NOTE — DISCHARGE PLANNING
Anticipated Discharge Disposition:   Home with help from spouse and 3 children and home health    Action:    Spoke to patient and her spouse, Reji.  She lives with her spouse and 3 daughters who are all self sufficient.  They live in [place].  They have a two story home and will have bed on 1st floor for her.  Shower is available on 1st floor.  They will purchase shower chair.  Hand rails already in place. Pt receiving O2 2LNC, wearing C Collar. Has pain at rest 2-3/10 and with position changes at 7-8/10.  Pt evaluated by OT and recommending home health and FWW. Pt requesting FWW.  Pt prefers home health and does not want to go to [facility].  PT recommending [facility].  Pt's pcp is Tanja Buckley MD [phone].    Tiger text to ANDI Gan to request home health referral. Request for FWW and home oxygen study please.   Pt's PCP will need to order for [place] home health providers.     RN CM received vm and telephone call from Ayden MARES CM, April requesting MDs direct # for a peer to peer and dc plan.  OT/PT evals were not completed yet and informed her that we were waiting for these.      Barriers to Discharge:    Surgery clearance    Plan:    F/U with [facility]ley RN CM.    Care Transition Team Assessment    Information Source  Orientation : Oriented x 4  Information Given By: Patient, Spouse  Informant's Name:  (Monica Franco)  Who is responsible for making decisions for patient? : Patient    Readmission Evaluation  Is this a readmission?: No    Elopement Risk  Legal Hold: No  Ambulatory or Self Mobile in Wheelchair: No-Not an Elopement Risk  Elopement Risk: Not at Risk for Elopement    Interdisciplinary Discharge Planning  Lives with - Patient's Self Care Capacity: Spouse, Child Less than 18 Years of Age  Patient or legal guardian wants to designate a caregiver (see row info): No  Housing / Facility: 2 Story House  Prior Services: None    Discharge Preparedness  What is your plan after discharge?: Home with help, Home  health care  What are your discharge supports?: Child, Spouse  Prior Functional Level: Ambulatory, Drives Self, Independent with Activities of Daily Living, Independent with Medication Management  Difficulity with IADLs: Driving, Laundry, Shopping    Functional Assesment  Prior Functional Level: Ambulatory, Drives Self, Independent with Activities of Daily Living, Independent with Medication Management    Finances  Financial Barriers to Discharge: No  Prescription Coverage: Yes    Vision / Hearing Impairment  Vision Impairment : No  Hearing Impairment : No         Advance Directive  Advance Directive?: None    Domestic Abuse  Have you ever been the victim of abuse or violence?: No  Physical Abuse or Sexual Abuse: No  Verbal Abuse or Emotional Abuse: No  Possible Abuse Reported to:: Not Applicable         Discharge Risks or Barriers  Discharge risks or barriers?: No    Anticipated Discharge Information  Anticipated discharge disposition: [facility], Home  Discharge Address:  ([address] [place] [address]  [id number])  Discharge Contact Phone Number:  ([phone])

## 2019-04-08 NOTE — PROGRESS NOTES
Neurosurgery Progress Note    Subjective:  Looks better this AM  GCS 15  R shoulder pain, right sided periscapular pain persist  Right hand numbness unchanged  Denies headache  Humeral head fracture    CT shows luisa Hoffman.     Exam:  GCS 15  Strength: (shoulder and arm pain does limit exam on the right)  Right shoulder 4/5, right bicep and tricep 4/5, otherwise strength is full  [place] J and TLSO  Barber collar for shower    BP  Min: 105/70  Max: 136/83  Pulse  Av.8  Min: 74  Max: 107  Resp  Av.1  Min: 14  Max: 20  Temp  Av.3 °C (99.2 °F)  Min: 37.1 °C (98.8 °F)  Max: 37.4 °C (99.4 °F)  Monitored Temp 2  Av.7 °C (99.9 °F)  Min: 37.6 °C (99.7 °F)  Max: 37.8 °C (100 °F)  SpO2  Av.5 %  Min: 91 %  Max: 97 %    No Data Recorded    Recent Labs      19   1427  19   WBC  18.2*  13.2*  9.6   RBC  4.28  3.46*  3.44*   HEMOGLOBIN  13.1  10.7*  10.6*   HEMATOCRIT  38.4  32.2*  30.7*   MCV  89.7  93.1  89.2   MCH  30.6  30.9  30.8   MCHC  34.1  33.2*  34.5   RDW  40.2  42.9  40.0   PLATELETCT  256  161*  124*   MPV  9.0  9.3  9.0     Recent Labs      19   1427  193  19   SODIUM  144  141  135   POTASSIUM  2.6*  4.4  3.9   CHLORIDE  105  111  103   CO2  22  22  23   GLUCOSE  173*  125*  110*   BUN  12  12  9   CREATININE  0.73  0.56  0.49*   CALCIUM  9.3  8.5  8.4*     Recent Labs      19   APTT  27.6   INR  1.20*           Intake/Output       19 0700 - 19 0659 19 07 - 19 0659      [id number] 3204-6014 Total [id number] 7415-9513 Total       Intake    P.O.  1200  -- 1200  --  -- --    P.O. 1200 -- 1200 -- -- --    Total Intake 1200 -- 1200 -- -- --       Output    Urine  250  -- 250  --  -- --    Output (mL) (Urethral Catheter Temperature probe) 250 -- 250 -- -- --    Total Output 250 -- 250 -- -- --       Net I/O     950 -- 950 -- -- --            Intake/Output Summary (Last 24 hours) at 19  0836  Last data filed at 04/07/19 1400   Gross per 24 hour   Intake             1000 ml   Output              175 ml   Net              825 ml            • enoxaparin (LOVENOX) injection  40 mg DAILY   • Respiratory Care per Protocol   Continuous RT   • Pharmacy Consult Request  1 Each PHARMACY TO DOSE   • docusate sodium  100 mg BID   • senna-docusate  1 Tab Q24HRS PRN   • polyethylene glycol/lytes  1 Packet BID   • magnesium hydroxide  30 mL DAILY   • bisacodyl  10 mg Q24HRS PRN   • ondansetron  4 mg Q4HRS PRN   • acetaminophen  1,000 mg Q6HRS   • oxyCODONE immediate-release  2.5 mg Q3HRS PRN   • oxyCODONE immediate-release  5 mg Q3HRS PRN   • MD ALERT...DO NOT ADMINISTER NSAIDS or ASPIRIN unless ORDERED By Neurosurgery  1 Each PRN   • diphenhydrAMINE  25 mg Q6HRS PRN    Or   • diphenhydrAMINE  25 mg Q6HRS PRN   • methocarbamol  750 mg Q8HRS PRN   • calcium carbonate  500 mg BID   • vitamin D  5,000 Units DAILY   • senna-docusate  1 Tab Q24HRS PRN   • fleet  1 Each Once PRN   • gabapentin  300 mg TID       Assessment and Plan:  Hospital day #3  POD #0  Prophylactic anticoagulation: yes         Start date/time: Today      Stable  1. Q4hrly neuro checks  2. lovenox ok  3. TLSO when OOB and active  4. Cervical collar on at all times  5. Home when cleared by trauma, o/p follow up at [facility]  6. Please call with any questions or concerns.  7. Increase gabapentin 600mg BID

## 2019-04-08 NOTE — THERAPY
"Speech Language Therapy Evaluation completed to address cognition.    Functional Status:  Pt was AAOx4, engaging in complex conversation with this clinician.  Pt was given various portions of a variety of standardized cognitive assessments, evaluating expressive and receptive language, reading comprehension, written language, memory, problem solving, reasoning, functional sequencing, executive functioning, safety awareness, calculations and medication management.  Pt was within normal limits for all domains assessed this date.  She was educated regarding s/sx of post-concussion syndrome to be aware of s/p discharge, and verbalized very good understanding.  Pt does not need speech therapy services in the acute care setting at this time, however SLP remains available for education or as requested.  Thank you for the consult.    Recommendations/Plan of Care: Pt does not need speech therapy services in the acute care setting at this time, however SLP remains available for education or as requested.     Post-Acute Therapy: Currently anticipate no further skilled therapy needs once patient is discharged from the inpatient setting.    See \"Rehab Therapy-Acute\" Patient Summary Report for complete documentation.   "

## 2019-04-09 ENCOUNTER — APPOINTMENT (OUTPATIENT)
Dept: RADIOLOGY | Facility: MEDICAL CENTER | Age: 49
DRG: 551 | End: 2019-04-09
Attending: NEUROLOGICAL SURGERY
Payer: COMMERCIAL

## 2019-04-09 ENCOUNTER — APPOINTMENT (OUTPATIENT)
Dept: RADIOLOGY | Facility: MEDICAL CENTER | Age: 49
DRG: 551 | End: 2019-04-09
Attending: PHYSICIAN ASSISTANT
Payer: COMMERCIAL

## 2019-04-09 ENCOUNTER — APPOINTMENT (OUTPATIENT)
Dept: RADIOLOGY | Facility: MEDICAL CENTER | Age: 49
DRG: 551 | End: 2019-04-09
Attending: NURSE PRACTITIONER
Payer: COMMERCIAL

## 2019-04-09 LAB
ALBUMIN SERPL BCP-MCNC: 4.1 G/DL (ref 3.2–4.9)
ALBUMIN/GLOB SERPL: 1.5 G/DL
ALP SERPL-CCNC: 58 U/L (ref 30–99)
ALT SERPL-CCNC: 36 U/L (ref 2–50)
ANION GAP SERPL CALC-SCNC: 10 MMOL/L (ref 0–11.9)
AST SERPL-CCNC: 39 U/L (ref 12–45)
BASOPHILS # BLD AUTO: 0.7 % (ref 0–1.8)
BASOPHILS # BLD: 0.05 K/UL (ref 0–0.12)
BILIRUB SERPL-MCNC: 0.8 MG/DL (ref 0.1–1.5)
BUN SERPL-MCNC: 8 MG/DL (ref 8–22)
CALCIUM SERPL-MCNC: 8.8 MG/DL (ref 8.5–10.5)
CHLORIDE SERPL-SCNC: 104 MMOL/L (ref 96–112)
CO2 SERPL-SCNC: 24 MMOL/L (ref 20–33)
CREAT SERPL-MCNC: 0.62 MG/DL (ref 0.5–1.4)
EOSINOPHIL # BLD AUTO: 0.11 K/UL (ref 0–0.51)
EOSINOPHIL NFR BLD: 1.4 % (ref 0–6.9)
ERYTHROCYTE [DISTWIDTH] IN BLOOD BY AUTOMATED COUNT: 40.2 FL (ref 35.9–50)
GLOBULIN SER CALC-MCNC: 2.8 G/DL (ref 1.9–3.5)
GLUCOSE SERPL-MCNC: 98 MG/DL (ref 65–99)
HCT VFR BLD AUTO: 38 % (ref 37–47)
HGB BLD-MCNC: 12.6 G/DL (ref 12–16)
IMM GRANULOCYTES # BLD AUTO: 0.03 K/UL (ref 0–0.11)
IMM GRANULOCYTES NFR BLD AUTO: 0.4 % (ref 0–0.9)
LYMPHOCYTES # BLD AUTO: 2.49 K/UL (ref 1–4.8)
LYMPHOCYTES NFR BLD: 32.7 % (ref 22–41)
MCH RBC QN AUTO: 30.2 PG (ref 27–33)
MCHC RBC AUTO-ENTMCNC: 33.2 G/DL (ref 33.6–35)
MCV RBC AUTO: 91.1 FL (ref 81.4–97.8)
MONOCYTES # BLD AUTO: 0.44 K/UL (ref 0–0.85)
MONOCYTES NFR BLD AUTO: 5.8 % (ref 0–13.4)
NEUTROPHILS # BLD AUTO: 4.49 K/UL (ref 2–7.15)
NEUTROPHILS NFR BLD: 59 % (ref 44–72)
NRBC # BLD AUTO: 0 K/UL
NRBC BLD-RTO: 0 /100 WBC
PLATELET # BLD AUTO: 137 K/UL (ref 164–446)
PMV BLD AUTO: 8.7 FL (ref 9–12.9)
POTASSIUM SERPL-SCNC: 3.9 MMOL/L (ref 3.6–5.5)
PROT SERPL-MCNC: 6.9 G/DL (ref 6–8.2)
RBC # BLD AUTO: 4.17 M/UL (ref 4.2–5.4)
SODIUM SERPL-SCNC: 138 MMOL/L (ref 135–145)
WBC # BLD AUTO: 7.6 K/UL (ref 4.8–10.8)

## 2019-04-09 PROCEDURE — 97116 GAIT TRAINING THERAPY: CPT

## 2019-04-09 PROCEDURE — 36415 COLL VENOUS BLD VENIPUNCTURE: CPT

## 2019-04-09 PROCEDURE — 770001 HCHG ROOM/CARE - MED/SURG/GYN PRIV*

## 2019-04-09 PROCEDURE — 97530 THERAPEUTIC ACTIVITIES: CPT

## 2019-04-09 PROCEDURE — 85025 COMPLETE CBC W/AUTO DIFF WBC: CPT

## 2019-04-09 PROCEDURE — 80053 COMPREHEN METABOLIC PANEL: CPT

## 2019-04-09 PROCEDURE — 73221 MRI JOINT UPR EXTREM W/O DYE: CPT | Mod: RT

## 2019-04-09 PROCEDURE — A9270 NON-COVERED ITEM OR SERVICE: HCPCS | Performed by: PHYSICIAN ASSISTANT

## 2019-04-09 PROCEDURE — 71045 X-RAY EXAM CHEST 1 VIEW: CPT

## 2019-04-09 PROCEDURE — 700102 HCHG RX REV CODE 250 W/ 637 OVERRIDE(OP): Performed by: NURSE PRACTITIONER

## 2019-04-09 PROCEDURE — 700112 HCHG RX REV CODE 229: Performed by: NURSE PRACTITIONER

## 2019-04-09 PROCEDURE — A9270 NON-COVERED ITEM OR SERVICE: HCPCS | Performed by: NURSE PRACTITIONER

## 2019-04-09 PROCEDURE — A9270 NON-COVERED ITEM OR SERVICE: HCPCS | Performed by: NEUROLOGICAL SURGERY

## 2019-04-09 PROCEDURE — 700102 HCHG RX REV CODE 250 W/ 637 OVERRIDE(OP): Performed by: NEUROLOGICAL SURGERY

## 2019-04-09 PROCEDURE — 700102 HCHG RX REV CODE 250 W/ 637 OVERRIDE(OP): Performed by: PHYSICIAN ASSISTANT

## 2019-04-09 PROCEDURE — 72040 X-RAY EXAM NECK SPINE 2-3 VW: CPT

## 2019-04-09 PROCEDURE — 72141 MRI NECK SPINE W/O DYE: CPT

## 2019-04-09 RX ORDER — DEXAMETHASONE 1 MG
2 TABLET ORAL DAILY
Status: DISCONTINUED | OUTPATIENT
Start: 2019-04-14 | End: 2019-04-10 | Stop reason: HOSPADM

## 2019-04-09 RX ORDER — DEXAMETHASONE 1 MG
2 TABLET ORAL EVERY 12 HOURS
Status: DISCONTINUED | OUTPATIENT
Start: 2019-04-12 | End: 2019-04-10 | Stop reason: HOSPADM

## 2019-04-09 RX ORDER — DEXAMETHASONE 6 MG/1
6 TABLET ORAL EVERY 12 HOURS
Status: DISCONTINUED | OUTPATIENT
Start: 2019-04-10 | End: 2019-04-10 | Stop reason: HOSPADM

## 2019-04-09 RX ORDER — DEXAMETHASONE 4 MG/1
4 TABLET ORAL EVERY 12 HOURS
Status: DISCONTINUED | OUTPATIENT
Start: 2019-04-11 | End: 2019-04-10 | Stop reason: HOSPADM

## 2019-04-09 RX ADMIN — ACETAMINOPHEN 1000 MG: 500 TABLET ORAL at 16:32

## 2019-04-09 RX ADMIN — DEXAMETHASONE 10 MG: 4 TABLET ORAL at 10:15

## 2019-04-09 RX ADMIN — OXYCODONE HYDROCHLORIDE 2.5 MG: 5 TABLET ORAL at 16:32

## 2019-04-09 RX ADMIN — ANTACID TABLETS 500 MG: 500 TABLET, CHEWABLE ORAL at 16:33

## 2019-04-09 RX ADMIN — DOCUSATE SODIUM 100 MG: 100 CAPSULE, LIQUID FILLED ORAL at 04:16

## 2019-04-09 RX ADMIN — VITAMIN D, TAB 1000IU (100/BT) 5000 UNITS: 25 TAB at 04:16

## 2019-04-09 RX ADMIN — ACETAMINOPHEN 1000 MG: 500 TABLET ORAL at 22:10

## 2019-04-09 RX ADMIN — ACETAMINOPHEN 1000 MG: 500 TABLET ORAL at 04:15

## 2019-04-09 RX ADMIN — DEXAMETHASONE 10 MG: 6 TABLET ORAL at 16:38

## 2019-04-09 RX ADMIN — POLYETHYLENE GLYCOL 3350 1 PACKET: 17 POWDER, FOR SOLUTION ORAL at 16:32

## 2019-04-09 RX ADMIN — OXYCODONE HYDROCHLORIDE 2.5 MG: 5 TABLET ORAL at 10:14

## 2019-04-09 RX ADMIN — MAGNESIUM CITRATE 296 ML: 1.75 LIQUID ORAL at 10:14

## 2019-04-09 RX ADMIN — METHOCARBAMOL TABLETS 750 MG: 750 TABLET, COATED ORAL at 22:10

## 2019-04-09 RX ADMIN — GABAPENTIN 600 MG: 300 CAPSULE ORAL at 04:15

## 2019-04-09 RX ADMIN — DOCUSATE SODIUM 100 MG: 100 CAPSULE, LIQUID FILLED ORAL at 16:33

## 2019-04-09 RX ADMIN — OXYCODONE HYDROCHLORIDE 2.5 MG: 5 TABLET ORAL at 04:16

## 2019-04-09 RX ADMIN — METHOCARBAMOL TABLETS 750 MG: 750 TABLET, COATED ORAL at 04:21

## 2019-04-09 RX ADMIN — GABAPENTIN 600 MG: 300 CAPSULE ORAL at 16:32

## 2019-04-09 ASSESSMENT — GAIT ASSESSMENTS
GAIT LEVEL OF ASSIST: MINIMAL ASSIST
ASSISTIVE DEVICE: FRONT WHEEL WALKER
DISTANCE (FEET): 50
DEVIATION: BRADYKINETIC

## 2019-04-09 ASSESSMENT — ENCOUNTER SYMPTOMS
SENSORY CHANGE: 1
ROS GI COMMENTS: BM PRIOR TO ARRIVAL
ABDOMINAL PAIN: 0
BLURRED VISION: 0
VOMITING: 0
BACK PAIN: 1
DOUBLE VISION: 0
FEVER: 0
NAUSEA: 0
CHILLS: 0
MYALGIAS: 1

## 2019-04-09 ASSESSMENT — COGNITIVE AND FUNCTIONAL STATUS - GENERAL
CLIMB 3 TO 5 STEPS WITH RAILING: A LITTLE
MOBILITY SCORE: 16
MOVING FROM LYING ON BACK TO SITTING ON SIDE OF FLAT BED: A LITTLE
SUGGESTED CMS G CODE MODIFIER MOBILITY: CK
WALKING IN HOSPITAL ROOM: A LITTLE
MOVING TO AND FROM BED TO CHAIR: A LOT
STANDING UP FROM CHAIR USING ARMS: A LITTLE
TURNING FROM BACK TO SIDE WHILE IN FLAT BAD: A LOT
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
## 2019-04-09 NOTE — FACE TO FACE
Face to Face Supporting Documentation - Home Health    The encounter with this patient was in whole or in part the primary reason for home health admission.    Date of encounter:   Patient:                    MRN:                       YOB: 2021  Monica Franco  [id number]  1970     Home health to see patient for:  Skilled Nursing care for assessment, interventions & education, Physical Therapy evaluation and treatment and Occupational therapy evaluation and treatment    Skilled need for:  New Onset Medical Diagnosis spine fracutres, arm fractures    Skilled nursing interventions to include:  Comment: evaluate    Homebound status evidenced by:  Need the aid of supportive devices such as crutches, canes, wheelchairs or walkers or Needs the assistance of another person in order to leave the home. Leaving home requires a considerable and taxing effort. There is a normal inability to leave the home.    Community Physician to provide follow up care: No primary care provider on file.     Optional Interventions? No      I certify the face to face encounter for this home health care referral meets the CMS requirements and the encounter/clinical assessment with the patient was, in whole, or in part, for the medical condition(s) listed above, which is the primary reason for home health care. Based on my clinical findings: the service(s) are medically necessary, support the need for home health care, and the homebound criteria are met.  I certify that this patient has had a face to face encounter by the nurse practitioner working collaboratively with me.  MARILYN Barnes - NPI: [id number]

## 2019-04-09 NOTE — PROGRESS NOTES
Neurosurgery Progress Note      Subjective:  Continues to improve  GCS 15  R shoulder/arm weakness persists, right sided periscapular pain persist  Right hand numbness unchanged  Denies headache  Humeral head fracture    CT shows luisa Hoffman.     Exam:  GCS 15  Strength:   Right shoulder abduction 4/5, right external rotator and tricep 4/5, otherwise strength is full  [place] J and TLSO  [place] collar for shower    BP  Min: 122/79  Max: 140/76  Pulse  Av.5  Min: 69  Max: 87  Resp  Av.3  Min: 16  Max: 18  Temp  Av.8 °C (98.2 °F)  Min: 36.6 °C (97.8 °F)  Max: 37.1 °C (98.8 °F)  SpO2  Av %  Min: 94 %  Max: 97 %    No Data Recorded    Recent Labs      19   WBC  13.2*  9.6  7.6   RBC  3.46*  3.44*  4.17*   HEMOGLOBIN  10.7*  10.6*  12.6   HEMATOCRIT  32.2*  30.7*  38.0   MCV  93.1  89.2  91.1   MCH  30.9  30.8  30.2   MCHC  33.2*  34.5  33.2*   RDW  42.9  40.0  40.2   PLATELETCT  161*  124*  137*   MPV  9.3  9.0  8.7*     Recent Labs      19   0414   SODIUM  141  135  138   POTASSIUM  4.4  3.9  3.9   CHLORIDE  111  103  104   CO2  22  23  24   GLUCOSE  125*  110*  98   BUN  12  9  8   CREATININE  0.56  0.49*  0.62   CALCIUM  8.5  8.4*  8.8     Recent Labs      19   1427   APTT  27.6   INR  1.20*           Intake/Output       19 - 19 - 04/10/19 0659      [id number] Total  Total       Intake    P.O.  240  -- 240  --  -- --    P.O. 240 -- 240 -- -- --    Total Intake 240 -- 240 -- -- --       Output    Urine  1000  -- 1000  --  -- --    Number of Times Voided 1 x 2 x 3 x -- -- --    Output (mL) ([REMOVED] Urethral Catheter Temperature probe) 1000 -- 1000 -- -- --    Total Output 1000 -- 1000 -- -- --       Net I/O     -760 -- -760 -- -- --            Intake/Output Summary (Last 24 hours) at 19 0810  Last data filed at 19 1300   Gross  per 24 hour   Intake              240 ml   Output             1000 ml   Net             -760 ml            • dexamethasone  10 mg Q6HRS    Followed by   • dexamethasone  10 mg Q12HRS    Followed by   • [START ON 4/10/2019] dexamethasone  6 mg Q12HRS    Followed by   • [START ON 4/11/2019] dexamethasone  4 mg Q12HRS    Followed by   • [START ON 4/12/2019] dexamethasone  2 mg Q12HRS    Followed by   • [START ON 4/14/2019] dexamethasone  2 mg DAILY   • gabapentin  600 mg BID   • enoxaparin (LOVENOX) injection  40 mg DAILY   • Respiratory Care per Protocol   Continuous RT   • Pharmacy Consult Request  1 Each PHARMACY TO DOSE   • docusate sodium  100 mg BID   • senna-docusate  1 Tab Q24HRS PRN   • polyethylene glycol/lytes  1 Packet BID   • magnesium hydroxide  30 mL DAILY   • bisacodyl  10 mg Q24HRS PRN   • ondansetron  4 mg Q4HRS PRN   • acetaminophen  1,000 mg Q6HRS   • oxyCODONE immediate-release  2.5 mg Q3HRS PRN   • oxyCODONE immediate-release  5 mg Q3HRS PRN   • MD ALERT...DO NOT ADMINISTER NSAIDS or ASPIRIN unless ORDERED By Neurosurgery  1 Each PRN   • diphenhydrAMINE  25 mg Q6HRS PRN    Or   • diphenhydrAMINE  25 mg Q6HRS PRN   • methocarbamol  750 mg Q8HRS PRN   • calcium carbonate  500 mg BID   • vitamin D  5,000 Units DAILY   • senna-docusate  1 Tab Q24HRS PRN   • fleet  1 Each Once PRN       Assessment and Plan:  Hospital day #4  POD #0  Prophylactic anticoagulation: no         Start date/time: Today      Stable  1. STAT MRI cervical and upright cervical xrays  2. Hold lovenox pending MRI  3. TLSO when OOB and active  4. Cervical collar on at all times  5. Home when cleared by trauma, pending MRI results, o/p follow up at [facility]  6. Please call with any questions or concerns.  7. Continue gabapentin  8. Dex taper    SALLY:  Agree with above R shoulder weakness. Some shoulder pain on movement. R triceps weakness.   Will get MR shoulder and neck and Xray.  I think it's a combination of  both  Unless severe pathology probably PT and wait and reassess and surgery if not improving.

## 2019-04-09 NOTE — PROGRESS NOTES
Pt aaox4. BUTTS 5/5. N/T R fingers. Up w/ SBA, steady gait from bed to WC. Pt c/o pain, Oxy given PRN w/ + results. +BS. Mag citrate given. Voiding w/o difficulty. TLSO on when OOB. C- collar in place at all times. Reviewed poc with pt-verbalized understanding. Pt started on decadron. X-rays done this AM. MRI pending. Call light in reach.

## 2019-04-09 NOTE — THERAPY
"Physical Therapy Treatment completed.   Bed Mobility:  Supine to Sit: Moderate Assist  Transfers: Sit to Stand: Supervised  Gait: Level Of Assist: Minimal Assist with Front-Wheel Walker       Plan of Care: Will benefit from Physical Therapy 4 times per week  Discharge Recommendations: Equipment: No Equipment Needed. Pt stating Ayden already delivered FWW.    See \"Rehab Therapy-Acute\" Patient Summary Report for complete documentation.     Pt progressing well w/ therapy. Pt is primarily limited by pain however is still motivated to move. Pt unable to don TLSO w/out assist and requested family training when SO gets in to town. Pt states bed mobility is the hardest because of the ribs. She was open to education on how to perform log roll w/ greater ease. Pt is able to increase her ambulation distances and stated she has been up as well w/ nursing staff. Pt states SO set up a room for her to sleep in downstairs. Pt is progressing to a level in which she can DC home w/ SO support and HH. Will continue to follow to address functional limitations.  "

## 2019-04-09 NOTE — DISCHARGE PLANNING
Anticipated Discharge Disposition:   Home with home health  Home oxygen    Action:    Home O2 study completed.  SpO2 RA at rest 86% and SpO2 RA ambulation 91%.     Spoke to bedside Jojo MARES and patient has not had BM since 4-5-19 and patient refusing milk of mag.  She will attempt again.    VM to SUZAN HUMPHRIES April with [employer] insurance plan to coordinate home health and DME.  Spoke with Morenita MARES CM and will set up home health, order oxygen delivery to patient's room and to her home, also order FWW.    Barriers to Discharge:    Medical clearance    Plan:    Update patient and collaborate with medical team.

## 2019-04-09 NOTE — PROGRESS NOTES
MOBILITY NOTE    Surgery patient?: No  Date of surgery: N/A  Ambulated 50 ft on day of surgery? (N/A if today is not date of surgery):N/A  Number of times ambulated 50 feet or greater today: 1  Patient has been up to chair, edge of bed or HOB 90 degrees for all meals?: Yes  Goal met? (goal is ambulating at least 50 feet 2 times on day shift, one time on night shift): Yes  If patient did not meet mobility goal, why?:N/A      13-Oct-2017

## 2019-04-10 ENCOUNTER — APPOINTMENT (OUTPATIENT)
Dept: RADIOLOGY | Facility: MEDICAL CENTER | Age: 49
DRG: 551 | End: 2019-04-10
Attending: NURSE PRACTITIONER
Payer: COMMERCIAL

## 2019-04-10 VITALS
OXYGEN SATURATION: 93 % | RESPIRATION RATE: 17 BRPM | DIASTOLIC BLOOD PRESSURE: 87 MMHG | TEMPERATURE: 98.2 F | HEART RATE: 85 BPM | SYSTOLIC BLOOD PRESSURE: 142 MMHG | HEIGHT: 64 IN | BODY MASS INDEX: 25.78 KG/M2 | WEIGHT: 151.01 LBS

## 2019-04-10 PROCEDURE — 97535 SELF CARE MNGMENT TRAINING: CPT

## 2019-04-10 PROCEDURE — 700102 HCHG RX REV CODE 250 W/ 637 OVERRIDE(OP): Performed by: PHYSICIAN ASSISTANT

## 2019-04-10 PROCEDURE — A9270 NON-COVERED ITEM OR SERVICE: HCPCS | Performed by: NEUROLOGICAL SURGERY

## 2019-04-10 PROCEDURE — 700102 HCHG RX REV CODE 250 W/ 637 OVERRIDE(OP): Performed by: NEUROLOGICAL SURGERY

## 2019-04-10 PROCEDURE — A9270 NON-COVERED ITEM OR SERVICE: HCPCS | Performed by: PHYSICIAN ASSISTANT

## 2019-04-10 PROCEDURE — 71045 X-RAY EXAM CHEST 1 VIEW: CPT

## 2019-04-10 RX ORDER — DIPHENHYDRAMINE HCL 25 MG
25 TABLET ORAL EVERY 6 HOURS PRN
Qty: 30 TAB | Refills: 0 | COMMUNITY
Start: 2019-04-10

## 2019-04-10 RX ORDER — GABAPENTIN 300 MG/1
600 CAPSULE ORAL 2 TIMES DAILY
Qty: 60 CAP | Refills: 0 | Status: SHIPPED | OUTPATIENT
Start: 2019-04-10

## 2019-04-10 RX ORDER — OXYCODONE HYDROCHLORIDE 5 MG/1
5 TABLET ORAL
Qty: 28 TAB | Refills: 0 | Status: SHIPPED | OUTPATIENT
Start: 2019-04-10 | End: 2019-04-17

## 2019-04-10 RX ORDER — METHOCARBAMOL 750 MG/1
750 TABLET, FILM COATED ORAL 3 TIMES DAILY
Qty: 60 TAB | Refills: 0 | Status: SHIPPED | OUTPATIENT
Start: 2019-04-10

## 2019-04-10 RX ADMIN — ACETAMINOPHEN 1000 MG: 500 TABLET ORAL at 11:33

## 2019-04-10 RX ADMIN — OXYCODONE HYDROCHLORIDE 2.5 MG: 5 TABLET ORAL at 14:16

## 2019-04-10 RX ADMIN — GABAPENTIN 600 MG: 300 CAPSULE ORAL at 04:17

## 2019-04-10 RX ADMIN — VITAMIN D, TAB 1000IU (100/BT) 5000 UNITS: 25 TAB at 04:18

## 2019-04-10 RX ADMIN — ACETAMINOPHEN 1000 MG: 500 TABLET ORAL at 04:17

## 2019-04-10 RX ADMIN — DEXAMETHASONE 10 MG: 6 TABLET ORAL at 04:17

## 2019-04-10 ASSESSMENT — COGNITIVE AND FUNCTIONAL STATUS - GENERAL
SUGGESTED CMS G CODE MODIFIER DAILY ACTIVITY: CK
PERSONAL GROOMING: A LITTLE
HELP NEEDED FOR BATHING: A LITTLE
DAILY ACTIVITIY SCORE: 19
DRESSING REGULAR UPPER BODY CLOTHING: A LITTLE
DRESSING REGULAR LOWER BODY CLOTHING: A LITTLE
TOILETING: A LITTLE

## 2019-04-10 NOTE — PROGRESS NOTES
Call back received from Wilber VOGEL. No surgery. LEOBARDO THOMAS. ROM okay. Follow up with ortho in 2 weeks.

## 2019-04-10 NOTE — PROGRESS NOTES
Patient discharged to home at this time. Patient educated on discharge instructions, home medications and follow up appointments. Patient verbalized understanding. Patient discharged in a stable condition.

## 2019-04-10 NOTE — CARE PLAN
Problem: Communication  Goal: The ability to communicate needs accurately and effectively will improve  Outcome: PROGRESSING AS EXPECTED      Problem: Pain Management  Goal: Pain level will decrease to patient's comfort goal  Outcome: PROGRESSING AS EXPECTED      Problem: Mobility  Goal: Risk for activity intolerance will decrease  Outcome: PROGRESSING AS EXPECTED

## 2019-04-10 NOTE — CARE PLAN
Problem: Communication  Goal: The ability to communicate needs accurately and effectively will improve  Outcome: MET Date Met: 04/10/19

## 2019-04-10 NOTE — DISCHARGE INSTRUCTIONS
Discharge Instructions    Discharged to home by car with relative. Discharged via wheelchair, hospital escort: Yes.  Special equipment needed: TLSO, C-collar, FWW and Oxygen    Be sure to schedule a follow-up appointment with your primary care doctor or any specialists as instructed.     Discharge Plan:     TLSO when out of bed.  [place] collar to be worn at all times.  Follow up with Ayden.    Diet Plan: Discussed  Activity Level: Discussed  Confirmed Follow up Appointment: Patient to Call and Schedule Appointment  Confirmed Symptoms Management: Discussed  Medication Reconciliation Updated: Yes  Influenza Vaccine Indication: Not indicated: Previously immunized this influenza season and > 8 years of age    I understand that a diet low in cholesterol, fat, and sodium is recommended for good health. Unless I have been given specific instructions below for another diet, I accept this instruction as my diet prescription.   Other diet: Regualr    Special Instructions: None    · Is patient discharged on Warfarin / Coumadin?   No     Depression / Suicide Risk    As you are discharged from this [facility] facility, it is important to learn how to keep safe from harming yourself.    Recognize the warning signs:  · Abrupt changes in personality, positive or negative- including increase in energy   · Giving away possessions  · Change in eating patterns- significant weight changes-  positive or negative  · Change in sleeping patterns- unable to sleep or sleeping all the time   · Unwillingness or inability to communicate  · Depression  · Unusual sadness, discouragement and loneliness  · Talk of wanting to die  · Neglect of personal appearance   · Rebelliousness- reckless behavior  · Withdrawal from people/activities they love  · Confusion- inability to concentrate     If you or a loved one observes any of these behaviors or has concerns about self-harm, here's what you can do:  · Talk about it- your feelings and reasons for  harming yourself  · Remove any means that you might use to hurt yourself (examples: pills, rope, extension cords, firearm)  · Get professional help from the community (Mental Health, Substance Abuse, psychological counseling)  · Do not be alone:Call your Safe Contact- someone whom you trust who will be there for you.  · Call your local CRISIS HOTLINE 673-2229 or 596-025-4723  · Call your local Children's Mobile Crisis Response Team [place] (538) 133-0577 or www.Planandoo  · Call the toll free National Suicide Prevention Hotlines   · National Suicide Prevention Lifeline 731-956-AWQY (3133)  · National Hope Line Network 800-SUICIDE (741-6851)
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
## 2019-04-10 NOTE — THERAPY
"Occupational Therapy Treatment completed with focus on ADLs, ADL transfers, patient education and caregiver training.  Functional Status:  CGA UB dressing, SPV LB dressing, standing grooming, extensive ed/training regarding donning/doffing TLSO and C-collar  Plan of Care: Will benefit from Occupational Therapy 3 times per week  Discharge Recommendations:  Equipment Shower Chair. Post-acute therapy Discharge to home with outpatient or home health for additional skilled therapy services.    See \"Rehab Therapy-Acute\" Patient Summary Report for complete documentation.     Pt seen for 2 different treatment sessions on this day for education. Ed/trained pt and spouse how to don/doff c-collar in supine with assist, how to don/doff pullover clothing while maintaining spinal precautions w/ c-collar on, and how to don/doff TLSO while seated EOB. Pt demo'd ability to tailor sit to perform LB dressing. Pt educational session provided this AM, spouse educational session performed right before DC. Spouse and pt reported that they felt confident about returning home and managing braces.   "

## 2019-04-10 NOTE — PROGRESS NOTES
Neurosurgery Progress Note      Subjective:  Continues to improve  GCS 15  R shoulder/arm weakness persists, humeral fx found on MRI  Right sided periscapular pain persist  Right hand numbness unchanged  Denies headache  Radial head fracture    CT shows luisa Hoffman.     Exam:  GCS 15  Strength:   Right shoulder abduction 4/5, right external rotator and tricep 4/5, otherwise strength is full  [place] J and TLSO  [place] collar for shower    BP  Min: 124/77  Max: 125/77  Pulse  Av.5  Min: 75  Max: 84  Resp  Av  Min: 16  Max: 18  Temp  Av.1 °C (98.7 °F)  Min: 36.9 °C (98.4 °F)  Max: 37.2 °C (99 °F)  SpO2  Av.5 %  Min: 92 %  Max: 95 %    No Data Recorded    Recent Labs      19   041   WBC  9.6  7.6   RBC  3.44*  4.17*   HEMOGLOBIN  10.6*  12.6   HEMATOCRIT  30.7*  38.0   MCV  89.2  91.1   MCH  30.8  30.2   MCHC  34.5  33.2*   RDW  40.0  40.2   PLATELETCT  124*  137*   MPV  9.0  8.7*     Recent Labs      19   041   SODIUM  135  138   POTASSIUM  3.9  3.9   CHLORIDE  103  104   CO2  23  24   GLUCOSE  110*  98   BUN  9  8   CREATININE  0.49*  0.62   CALCIUM  8.4*  8.8               Intake/Output       19 - 04/10/19 0659 04/10/19 0700 - 19 0659      1900-0659 Total 1900-0659 Total       Intake    P.O.  1240  -- 1240  --  -- --    P.O. 1240 -- 1240 -- -- --    Total Intake 1240 -- 1240 -- -- --       Output    Urine  --  -- --  --  -- --    Number of Times Voided 2 x -- 2 x -- -- --    Stool  --  -- --  --  -- --    Number of Times Stooled 0 x -- 0 x -- -- --    Total Output -- -- -- -- -- --       Net I/O     1240 -- 1240 -- -- --            Intake/Output Summary (Last 24 hours) at 04/10/19 0825  Last data filed at 19 1700   Gross per 24 hour   Intake             1000 ml   Output                0 ml   Net             1000 ml            • dexamethasone  6 mg Q12HRS    Followed by   • [START ON 2019]  dexamethasone  4 mg Q12HRS    Followed by   • [START ON 4/12/2019] dexamethasone  2 mg Q12HRS    Followed by   • [START ON 4/14/2019] dexamethasone  2 mg DAILY   • gabapentin  600 mg BID   • Respiratory Care per Protocol   Continuous RT   • Pharmacy Consult Request  1 Each PHARMACY TO DOSE   • docusate sodium  100 mg BID   • senna-docusate  1 Tab Q24HRS PRN   • polyethylene glycol/lytes  1 Packet BID   • magnesium hydroxide  30 mL DAILY   • bisacodyl  10 mg Q24HRS PRN   • ondansetron  4 mg Q4HRS PRN   • acetaminophen  1,000 mg Q6HRS   • oxyCODONE immediate-release  2.5 mg Q3HRS PRN   • oxyCODONE immediate-release  5 mg Q3HRS PRN   • MD ALERT...DO NOT ADMINISTER NSAIDS or ASPIRIN unless ORDERED By Neurosurgery  1 Each PRN   • diphenhydrAMINE  25 mg Q6HRS PRN    Or   • diphenhydrAMINE  25 mg Q6HRS PRN   • methocarbamol  750 mg Q8HRS PRN   • calcium carbonate  500 mg BID   • vitamin D  5,000 Units DAILY   • senna-docusate  1 Tab Q24HRS PRN   • fleet  1 Each Once PRN       Assessment and Plan:  Hospital day #5  POD #0  Prophylactic anticoagulation: no         Start date/time: Today    Case was discussed with Dr. Tian and Emigdio ESCAMILLA Trauma services  Plan:  1. Okay to restart Lovenox if needed  2. TLSO when OOB and active  3. Cervical collar on at all times  4. Continue dex taper,   5. Cleared for discharge per Trauma  6. Please call with any questions

## 2019-04-10 NOTE — CARE PLAN
Problem: Respiratory:  Goal: Respiratory status will improve  Outcome: MET Date Met: 04/10/19
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
